# Patient Record
Sex: FEMALE | Race: WHITE | NOT HISPANIC OR LATINO | Employment: OTHER | URBAN - METROPOLITAN AREA
[De-identification: names, ages, dates, MRNs, and addresses within clinical notes are randomized per-mention and may not be internally consistent; named-entity substitution may affect disease eponyms.]

---

## 2017-05-18 ENCOUNTER — TRANSFERRED RECORDS (OUTPATIENT)
Dept: HEALTH INFORMATION MANAGEMENT | Facility: CLINIC | Age: 62
End: 2017-05-18

## 2017-07-25 DIAGNOSIS — F43.23 ADJUSTMENT DISORDER WITH MIXED ANXIETY AND DEPRESSED MOOD: ICD-10-CM

## 2017-07-25 NOTE — TELEPHONE ENCOUNTER
Fluoxetine 20mg     Last Written Prescription Date: 5/10/2016  Last Fill Quantity: 90, # refills: 3  Last Office Visit with [facility] primary care provider:  5/10/2016 annual exam with Dr. La, no future appt        Last PHQ-9 score on record=   PHQ-9 SCORE 5/10/2016   Total Score 3

## 2017-08-28 DIAGNOSIS — F43.23 ADJUSTMENT DISORDER WITH MIXED ANXIETY AND DEPRESSED MOOD: ICD-10-CM

## 2017-08-28 NOTE — TELEPHONE ENCOUNTER
Pending Prescriptions:                       Disp   Refills    FLUoxetine (PROZAC) 20 MG capsule [Pharmac*30 cap*0        Sig: TAKE ONE CAPSULE BY MOUTH EVERY DAY           Last Written Prescription Date: 7/25/2017  Last Fill Quantity: 30, # refills: 0  Last Office Visit with [facility] primary care provider:  5/10/2016     Next 5 appointments (look out 90 days)     Sep 08, 2017  9:30 AM CDT   PHYSICAL with Sonia Bowers MD   [facility] ([facility]    [address] [place] [id number] [phone]                   Last PHQ-9 score on record=   PHQ-9 SCORE 5/10/2016   Total Score 3

## 2017-09-08 ENCOUNTER — OFFICE VISIT (OUTPATIENT)
Dept: OBGYN | Facility: CLINIC | Age: 62
End: 2017-09-08
Payer: COMMERCIAL

## 2017-09-08 VITALS
HEIGHT: 65 IN | BODY MASS INDEX: 34.16 KG/M2 | SYSTOLIC BLOOD PRESSURE: 128 MMHG | WEIGHT: 205 LBS | DIASTOLIC BLOOD PRESSURE: 80 MMHG

## 2017-09-08 DIAGNOSIS — E78.5 HYPERLIPIDEMIA LDL GOAL <130: ICD-10-CM

## 2017-09-08 DIAGNOSIS — Z13.220 ENCOUNTER FOR LIPID SCREENING FOR CARDIOVASCULAR DISEASE: ICD-10-CM

## 2017-09-08 DIAGNOSIS — Z13.6 ENCOUNTER FOR LIPID SCREENING FOR CARDIOVASCULAR DISEASE: ICD-10-CM

## 2017-09-08 DIAGNOSIS — Z13.29 SCREENING FOR THYROID DISORDER: ICD-10-CM

## 2017-09-08 DIAGNOSIS — Z01.419 ENCOUNTER FOR GYNECOLOGICAL EXAMINATION WITHOUT ABNORMAL FINDING: Primary | ICD-10-CM

## 2017-09-08 DIAGNOSIS — Z13.1 SCREENING FOR DIABETES MELLITUS: ICD-10-CM

## 2017-09-08 DIAGNOSIS — F43.23 ADJUSTMENT DISORDER WITH MIXED ANXIETY AND DEPRESSED MOOD: ICD-10-CM

## 2017-09-08 LAB
CHOLEST SERPL-MCNC: 214 MG/DL
GLUCOSE BLD-MCNC: 84 MG/DL (ref 70–99)
HDLC SERPL-MCNC: 46 MG/DL
LDLC SERPL CALC-MCNC: 125 MG/DL
NONHDLC SERPL-MCNC: 168 MG/DL
TRIGL SERPL-MCNC: 214 MG/DL
TSH SERPL DL<=0.005 MIU/L-ACNC: 3.2 MU/L (ref 0.4–4)

## 2017-09-08 PROCEDURE — 84443 ASSAY THYROID STIM HORMONE: CPT | Performed by: OBSTETRICS & GYNECOLOGY

## 2017-09-08 PROCEDURE — 36415 COLL VENOUS BLD VENIPUNCTURE: CPT | Performed by: OBSTETRICS & GYNECOLOGY

## 2017-09-08 PROCEDURE — 99396 PREV VISIT EST AGE 40-64: CPT | Performed by: OBSTETRICS & GYNECOLOGY

## 2017-09-08 PROCEDURE — 82947 ASSAY GLUCOSE BLOOD QUANT: CPT | Performed by: OBSTETRICS & GYNECOLOGY

## 2017-09-08 PROCEDURE — 80061 LIPID PANEL: CPT | Performed by: OBSTETRICS & GYNECOLOGY

## 2017-09-08 ASSESSMENT — ANXIETY QUESTIONNAIRES
3. WORRYING TOO MUCH ABOUT DIFFERENT THINGS: NOT AT ALL
6. BECOMING EASILY ANNOYED OR IRRITABLE: NOT AT ALL
5. BEING SO RESTLESS THAT IT IS HARD TO SIT STILL: NOT AT ALL
2. NOT BEING ABLE TO STOP OR CONTROL WORRYING: NOT AT ALL
IF YOU CHECKED OFF ANY PROBLEMS ON THIS QUESTIONNAIRE, HOW DIFFICULT HAVE THESE PROBLEMS MADE IT FOR YOU TO DO YOUR WORK, TAKE CARE OF THINGS AT HOME, OR GET ALONG WITH OTHER PEOPLE: NOT DIFFICULT AT ALL
GAD7 TOTAL SCORE: 0
7. FEELING AFRAID AS IF SOMETHING AWFUL MIGHT HAPPEN: NOT AT ALL
1. FEELING NERVOUS, ANXIOUS, OR ON EDGE: NOT AT ALL

## 2017-09-08 ASSESSMENT — PATIENT HEALTH QUESTIONNAIRE - PHQ9
5. POOR APPETITE OR OVEREATING: NOT AT ALL
SUM OF ALL RESPONSES TO PHQ QUESTIONS 1-9: 1

## 2017-09-08 NOTE — MR AVS SNAPSHOT
After Visit Summary   9/8/2017    Marina Escudero    MRN: [id number]           Patient Information     Date Of Birth          1955        Visit Information        Provider Department      9/8/2017 9:30 AM Sonia Bowers MD [facility] [place]        Today's Diagnoses     Encounter for gynecological examination without abnormal finding    -  1    Encounter for lipid screening for cardiovascular disease        Screening for diabetes mellitus        Screening for thyroid disorder        Adjustment disorder with mixed anxiety and depressed mood        Hyperlipidemia LDL goal <130          Care Instructions    Schedule your annual screening mammogram  Follow up with your primary care provider for your other medical problems.  Continue self breast exam.  Increase physical activity and exercise.  Lab results will be called to the patient.  Usual safety and preventative measures counseling done.  BMI >25  Weight loss encouraged.  Last pap smear (2016) was normal but no HPV testing done.  No pap was obtained this year but will repeat with co-testing in 3yrs.  This was discussed with the patient and she agrees with the plan.  Discussed Osteoporosis screening as well as calcium and Vitamin D recommendations.  Will repeat bone scan at age 64.          Follow-ups after your visit        Follow-up notes from your care team     Return in about 1 year (around 9/8/2018) for Annual Exam.      Your next 10 appointments already scheduled     Sep 14, 2017 10:15 AM CDT   MA SCREENING DIGITAL BILATERAL with WEMA1   [facility] [place] ([facility] [place])    [address] [place] [address], [address]  [facility] [address] [phone]           Do not use any powder, lotion or deodorant under your arms or on your breast. If you do, we will ask you to remove it before your exam.  Wear comfortable, two-piece clothing.  If you have any allergies, tell your care team.  Bring any previous  "mammograms from other facilities or have them mailed to the breast center. Three-dimensional (3D) mammograms are available at [facility] locations in [facility], [facility], and [place]. [facility] locations include [place] and [facility] in [place]. Benefits of 3D mammograms include: - Improved rate of cancer detection - Decreases your chance of having to go back for more tests, which means fewer: - \"False-positive\" results (This means that there is an abnormal area but it isn't cancer.) - Invasive testing procedures, such as a biopsy or surgery - Can provide clearer images of the breast if you have dense breast tissue. 3D mammography is an optional exam that anyone can have with a 2D mammogram. It doesn't replace or take the place of a 2D mammogram. 2D mammograms remain an effective screening test for all women.  Not all insurance companies cover the cost of a 3D mammogram. Check with your insurance.              Who to contact     If you have questions or need follow up information about today's clinic visit or your schedule please contact [facility] directly at [phone].  Normal or non-critical lab and imaging results will be communicated to you by [facility], letter or phone within 4 business days after the clinic has received the results. If you do not hear from us within 7 days, please contact the clinic through [employer] or phone. If you have a critical or abnormal lab result, we will notify you by phone as soon as possible.  Submit refill requests through [employer]ond or call your pharmacy and they will forward the refill request to us. Please allow 3 business days for your refill to be completed.          Additional Information About Your Visit        [facility] [employer] Information     infibond lets you send messages to your doctor, view your test results, renew your prescriptions, schedule appointments and more. To sign up, go to [email or web address] " ". Click on \"Log in\" on the left side of the screen, which will take you to the Welcome page. Then click on \"Sign up Now\" on the right side of the page.     You will be asked to enter the access code listed below, as well as some personal information. Please follow the directions to create your username and password.     Your access code is: [email or web address]  Expires: 2017 10:16 AM     Your access code will  in 90 days. If you need help or a new code, please call your [facility] or [phone].        Care EveryWhere ID     This is your Care EveryWhere ID. This could be used by other organizations to access your [facility] medical records  [id number]        Your Vitals Were     Height Breastfeeding? BMI (Body Mass Index)             5' 5\" (1.651 m) No 34.11 kg/m2          Blood Pressure from Last 3 Encounters:   17 128/80   05/10/16 130/80   10/01/15 120/76    Weight from Last 3 Encounters:   17 205 lb (93 kg)   05/10/16 199 lb (90.3 kg)   10/01/15 214 lb (97.1 kg)              We Performed the Following     Glucose, whole blood     Lipid panel reflex to direct LDL     TSH with Free T4 Reflex          Today's Medication Changes          These changes are accurate as of: 17 10:16 AM.  If you have any questions, ask your nurse or doctor.               These medicines have changed or have updated prescriptions.        Dose/Directions    FLUoxetine 20 MG capsule   Commonly known as:  PROzac   This may have changed:  See the new instructions.   Used for:  Adjustment disorder with mixed anxiety and depressed mood   Changed by:  Sonia Bowers MD        Dose:  20 mg   Take 1 capsule (20 mg) by mouth daily   Quantity:  90 capsule   Refills:  3            Where to get your medicines      These medications were sent to [facility] [address]  [place], [place] - [address]  [address] [place] [address]     Phone:  [phone]     FLUoxetine 20 MG capsule                Primary Care " Provider Office Phone # Fax #    Isidro La [phone]       XXX RETIRED XXX [address] [address]        Equal Access to Services     PREM MO : Hadduncan karthikeyan ku shanao Soindraali, waaxda luqadaha, qaybta kaalmada adeegyada, waxbenjamin clark kaseydamian solis noelle vergara. So [facility] [phone].    ATENCIÓN: Si habla español, tiene a kevin disposición servicios gratuitos de asistencia lingüística. Llame al [phone].    We comply with applicable federal civil rights laws and [place] laws. We do not discriminate on the basis of race, color, national origin, age, disability sex, sexual orientation or gender identity.            Thank you!     Thank you for choosing [facility]  for your care. Our goal is always to provide you with excellent care. Hearing back from our patients is one way we can continue to improve our services. Please take a few minutes to complete the written survey that you may receive in the mail after your visit with us. Thank you!             Your Updated Medication List - Protect others around you: Learn how to safely use, store and throw away your medicines at www.disposemymeds.org.          This list is accurate as of: 9/8/17 10:16 AM.  Always use your most recent med list.                   Brand Name Dispense Instructions for use Diagnosis    ASPIRIN PO      Take 81 mg by mouth        BIOTIN 5000 PO           flax seed oil 1000 MG capsule      Take 1 capsule by mouth daily        FLUoxetine 20 MG capsule    PROzac    90 capsule    Take 1 capsule (20 mg) by mouth daily    Adjustment disorder with mixed anxiety and depressed mood       MULTIVITAMIN PO      Take 1 tablet by mouth daily        neomycin-polymyxin-hydrocortisone 3.5-71479-7 otic suspension    CORTISPORIN    10 mL    Place 4 drops in ear(s) 4 times daily    Ear pain, right       OMEGA-3 FISH OIL PO      Take 1 tablet by mouth daily        VITAMIN D (CHOLECALCIFEROL) PO       Take by mouth daily

## 2017-09-08 NOTE — PATIENT INSTRUCTIONS
Schedule your annual screening mammogram  Follow up with your primary care provider for your other medical problems.  Continue self breast exam.  Increase physical activity and exercise.  Lab results will be called to the patient.  Usual safety and preventative measures counseling done.  BMI >25  Weight loss encouraged.  Last pap smear (2016) was normal but no HPV testing done.  No pap was obtained this year but will repeat with co-testing in 3yrs.  This was discussed with the patient and she agrees with the plan.  Discussed Osteoporosis screening as well as calcium and Vitamin D recommendations.  Will repeat bone scan at age 64.

## 2017-09-08 NOTE — PROGRESS NOTES
Please inform of results --cholesterol elevated.  Total cholesterol and triglycerides most concerning.  HDL (good cholesterol) is a bit low but LDL (bad cholesterol) normal (<140).  Thyroid and FBS normal.  I would like Marina to work really hard on diet and exercise over the next 3 months and then repeat fasting cholesterol testing.  Limit her trans and saturated fats, limit carb/sugar intake, limit alcohol, decrease calorie intake and work on weight loss.  Increase exercise as able.  May consider adding daily fish oil to regimen to target elevated TGs.   If still abnormal after these changes, may consider seeing internal medicine MD for assistance

## 2017-09-08 NOTE — PROGRESS NOTES
Marina is a 62 year old No obstetric history on file. female who presents for annual exam.     Besides routine health maintenance,  she would like to discuss cold x 1 week, breast reduction and fasting for labs..    HPI:  Here today for yearly exam --former patient of Dr. La.  Doing well.  Postmenopausal.  No vb/spotting.  Denies hot flushes or night sweats --had very easy menopausal transition.  Not currently SA and denies any vaginal dryness or pain.  No bowel/bladder issues.  No leaking or incontinence    ; retired this year!!  Worked in [employer] for years with [employer] and then her last 5yrs with [employer]; now working parttime at [employer] as ; loving it!  -staying active at work; not much formal exercise --hoping to uncover and start using stationary bike  +needs to arrange mammmogram; +SBE on occ --considering breast reduction as back and shoulder pain has worsened  No PCP --fasting today for bloodwork  Up to date on colonoscopy  -declines flu shot today as still recovering from URI  *daughter is Sonia Aaron    GYNECOLOGIC HISTORY:    No LMP recorded. Patient is postmenopausal.  Her current contraception method is: menopause.  She  reports that she has never smoked. She has never used smokeless tobacco.      Patient is not sexually active.  STD testing offered?  Declined  Last PHQ-9 score on record =   PHQ-9 SCORE 9/8/2017   Total Score 1     Last GAD7 score on record =   JAY-7 SCORE 9/8/2017   Total Score 0     Alcohol Score = 2    HEALTH MAINTENANCE:  Cholesterol: (  Cholesterol   Date Value Ref Range Status   03/10/2015 259 (A) 115 - 199 mg/dL Final   11/14/2013 203 (A) 115 - 199 mg/dL Final    3/10/15   Total= 259, Triglycerides=283, HDL=52, JLN=656  Last Mammo: one year ago, Result: normal, Next Mammo: will schedule   Pap:   Lab Results   Component Value Date    PAP NIL 05/10/2016    5/10/16 WNL   Colonoscopy:  2016, Result: normal, Next Colonoscopy: 9  years.  Dexa:  5/10/16    Health maintenance updated:  yes    HISTORY:  Obstetric History       T1      L0     SAB0   TAB0   Ectopic0   Multiple0   Live Births0       # Outcome Date GA Lbr Sammy/2nd Weight Sex Delivery Anes PTL Lv   1 Term               Name: Sonia Aaron          Patient Active Problem List   Diagnosis     Pain in joint, lower leg     Edema     Tibia/fibula fracture     Hyperlipidemia LDL goal <130     Recurrent major depression in remission (H)     Past Surgical History:   Procedure Laterality Date     ARTHROPLASTY CARPOMETACARPAL (THUMB JOINT)  2014    Procedure: ARTHROPLASTY CARPOMETACARPAL (THUMB JOINT);  Surgeon: Ariana Kaminski MD;  [facility]: [facility]     ENT SURGERY      rhinoplasty     GI SURGERY      laparoscopy     GYN SURGERY      c section     KNEE SURGERY Right     fracture; TRIA      Social History   Substance Use Topics     Smoking status: Never Smoker     Smokeless tobacco: Never Used     Alcohol use 0.0 oz/week     0 Standard drinks or equivalent per week      Comment: social      Problem (# of Occurrences) Relation (Name,Age of Onset)    Breast Cancer (1) Paternal Grandmother    DIABETES (1) Mother    Hypertension (1) Mother            Current Outpatient Prescriptions   Medication Sig     VITAMIN D, CHOLECALCIFEROL, PO Take by mouth daily     FLUoxetine (PROZAC) 20 MG capsule Take 1 capsule (20 mg) by mouth daily     BIOTIN 5000 PO      ASPIRIN PO Take 81 mg by mouth     Flaxseed, Linseed, (FLAX SEED OIL) 1000 MG capsule Take 1 capsule by mouth daily     Omega-3 Fatty Acids (OMEGA-3 FISH OIL PO) Take 1 tablet by mouth daily     Multiple Vitamins-Minerals (MULTIVITAMIN OR) Take 1 tablet by mouth daily     [DISCONTINUED] FLUoxetine (PROZAC) 20 MG capsule TAKE ONE CAPSULE BY MOUTH EVERY DAY     neomycin-polymyxin-hydrocortisone (CORTISPORIN) 3.5-04224-8 otic suspension Place 4 drops in ear(s) 4 times daily (Patient not taking: Reported on 2017)     No  "current facility-administered medications for this visit.      No Known Allergies    Past medical, surgical, social and family histories were reviewed and updated in EPIC.    ROS:   12 point review of systems negative other than symptoms noted below.  Constitutional: Fatigue  Head: Nasal Congestion  Respiratory: Cough  Musculoskeletal: Joint Pain    EXAM:  /80  Ht 5' 5\" (1.651 m)  Wt 205 lb (93 kg)  Breastfeeding? No  BMI 34.11 kg/m2   BMI: Body mass index is 34.11 kg/(m^2).    PHYSICAL EXAM:  Constitutional:  Appearance: Well nourished, well developed, alert, in no acute distress  Neck:  Lymph Nodes:  No lymphadenopathy present    Thyroid:  Gland size normal, nontender, no nodules or masses present  on palpation  Chest:  Respiratory Effort:  Breathing unlabored  Cardiovascular:    Heart: Auscultation:  Regular rate, normal rhythm, no murmurs present  Breasts: Inspection of Breasts:  No lymphadenopathy present., Palpation of Breasts and Axillae:  No masses present on palpation, no breast tenderness., Axillary Lymph Nodes:  No lymphadenopathy present. and No nodularity, asymmetry or nipple discharge bilaterally.  Gastrointestinal:   Abdominal Examination:  Abdomen nontender to palpation, tone normal without rigidity or guarding, no masses present, umbilicus without lesions   Liver and Spleen:  No hepatomegaly present, liver nontender to palpation    Hernias:  No hernias present  Lymphatic: Lymph Nodes:  No other lymphadenopathy present  Skin:  General Inspection:  No rashes present, no lesions present, no areas of  discoloration    Genitalia and Groin:  No rashes present, no lesions present, no areas of  discoloration, no masses present  Neurologic/Psychiatric:    Mental Status:  Oriented X3     Pelvic Exam:  External Genitalia:     Normal appearance for age, no discharge present, no tenderness present, no inflammatory lesions present, color normal  Vagina:     Normal vaginal vault without central or " paravaginal defects, no discharge present, no inflammatory lesions present, no masses present  Bladder:     Nontender to palpation  Urethra:   Urethral Body:  Urethra palpation normal, urethra structural support normal   Urethral Meatus:  No erythema or lesions present  Cervix:     Appearance healthy, no lesions present, nontender to palpation, no bleeding present  Uterus:     Uterus: firm, normal sized and nontender, midplane in position.   Adnexa:     No adnexal tenderness present, no adnexal masses present  Perineum:     Perineum within normal limits, no evidence of trauma, no rashes or skin lesions present  Anus:     Anus within normal limits, no hemorrhoids present  Inguinal Lymph Nodes:     No lymphadenopathy present  Pubic Hair:     Normal pubic hair distribution for age  Genitalia and Groin:     No rashes present, no lesions present, no areas of discoloration, no masses present      COUNSELING:   Reviewed preventive health counseling, as reflected in patient instructions  Special attention given to:        Regular exercise       Healthy diet/nutrition    BMI: Body mass index is 34.11 kg/(m^2).  Weight management plan: Discussed healthy diet and exercise guidelines and patient will follow up in 12 months in clinic to re-evaluate.    ASSESSMENT:  62 year old female with satisfactory annual exam.    ICD-10-CM    1. Encounter for gynecological examination without abnormal finding Z01.419    2. Encounter for lipid screening for cardiovascular disease Z13.220 Lipid panel reflex to direct LDL    Z13.6    3. Screening for diabetes mellitus Z13.1 Glucose, whole blood   4. Screening for thyroid disorder Z13.29 TSH with Free T4 Reflex   5. Adjustment disorder with mixed anxiety and depressed mood F43.23 FLUoxetine (PROZAC) 20 MG capsule   6. Hyperlipidemia LDL goal <130 E78.5        PLAN:  Patient Instructions   Schedule your annual screening mammogram  Follow up with your primary care provider for your other medical  problems.  Continue self breast exam.  Increase physical activity and exercise.  Lab results will be called to the patient.  Usual safety and preventative measures counseling done.  BMI >25  Weight loss encouraged.  Last pap smear (2016) was normal but no HPV testing done.  No pap was obtained this year but will repeat with co-testing in 3yrs.  This was discussed with the patient and she agrees with the plan.  Discussed Osteoporosis screening as well as calcium and Vitamin D recommendations.  Will repeat bone scan at age 64.      Sonia Bowers MD

## 2017-09-09 ASSESSMENT — ANXIETY QUESTIONNAIRES: GAD7 TOTAL SCORE: 0

## 2017-09-11 ENCOUNTER — TELEPHONE (OUTPATIENT)
Dept: NURSING | Facility: CLINIC | Age: 62
End: 2017-09-11

## 2017-09-11 DIAGNOSIS — Z13.220 SCREENING, LIPID: Primary | ICD-10-CM

## 2017-09-11 NOTE — TELEPHONE ENCOUNTER
I just recommended either upstairs at [facility] or Dr. Darwin Valenzuela (not sure that this is the correct spelling)

## 2017-09-11 NOTE — TELEPHONE ENCOUNTER
Pt informed. Future lab ordered. Pt lost name of plastic surgeon Dr. Bowers referred her to for breast reduction. Routing to Dr. Bowers. Please advise. Can leave message on pt's phone.     Notes Recorded by Sonia Bowers MD on 9/8/2017 at 5:02 PM  Please inform of results --cholesterol elevated.  Total cholesterol and triglycerides most concerning.  HDL (good cholesterol) is a bit low but LDL (bad cholesterol) normal (<140).  Thyroid and FBS normal.  I would like Marina to work really hard on diet and exercise over the next 3 months and then repeat fasting cholesterol testing.  Limit her trans and saturated fats, limit carb/sugar intake, limit alcohol, decrease calorie intake and work on weight loss.  Increase exercise as able.  May consider adding daily fish oil to regimen to target elevated TGs.   If still abnormal after these changes, may consider seeing internal medicine MD for assistance

## 2017-09-14 ENCOUNTER — RADIANT APPOINTMENT (OUTPATIENT)
Dept: MAMMOGRAPHY | Facility: CLINIC | Age: 62
End: 2017-09-14
Payer: COMMERCIAL

## 2017-09-14 DIAGNOSIS — Z12.31 VISIT FOR SCREENING MAMMOGRAM: ICD-10-CM

## 2017-09-14 PROCEDURE — G0202 SCR MAMMO BI INCL CAD: HCPCS | Mod: TC

## 2018-04-24 ENCOUNTER — TRANSFERRED RECORDS (OUTPATIENT)
Dept: HEALTH INFORMATION MANAGEMENT | Facility: CLINIC | Age: 63
End: 2018-04-24

## 2018-08-30 ENCOUNTER — TRANSFERRED RECORDS (OUTPATIENT)
Dept: HEALTH INFORMATION MANAGEMENT | Facility: CLINIC | Age: 63
End: 2018-08-30

## 2018-09-14 DIAGNOSIS — F43.23 ADJUSTMENT DISORDER WITH MIXED ANXIETY AND DEPRESSED MOOD: ICD-10-CM

## 2018-09-14 NOTE — TELEPHONE ENCOUNTER
"Requested Prescriptions   Pending Prescriptions Disp Refills     FLUoxetine (PROZAC) 20 MG capsule [Pharmacy Med Name: FLUOXETINE HCL 20MG CAPS] 90 capsule 3     Sig: TAKE ONE CAPSULE (20 MG) BY MOUTH DAILY    SSRIs Protocol Failed    9/14/2018  5:13 AM       Failed - Recent (12 mo) or future (30 days) visit within the authorizing provider's specialty    Patient had office visit in the last 12 months or has a visit in the next 30 days with authorizing provider or within the authorizing provider's specialty.  See \"Patient Info\" tab in inbasket, or \"Choose Columns\" in Meds & Orders section of the refill encounter.           Passed - Patient is age 18 or older       Passed - No active pregnancy on record       Passed - No positive pregnancy test in last 12 months        Last Written Prescription Date:  9/8/17  Last Fill Quantity: 90,  # refills: 3   Last office visit: 9/8/2017 with prescribing provider:  Robinson   Future Office Visit:   Next 5 appointments (look out 90 days)     Oct 17, 2018  9:00 AM CDT   PHYSICAL with Sonia Bowers MD   [facility] [place] ([facility] [place])    [address] [address] [phone]                   "

## 2018-09-17 NOTE — TELEPHONE ENCOUNTER
Left voicemail for patient to call back. She has an appt next month. Will she run out of this med before that appt? How is the med working for her?      Pamella Hsieh RN

## 2018-10-17 ENCOUNTER — OFFICE VISIT (OUTPATIENT)
Dept: OBGYN | Facility: CLINIC | Age: 63
End: 2018-10-17
Payer: COMMERCIAL

## 2018-10-17 ENCOUNTER — TELEPHONE (OUTPATIENT)
Dept: NURSING | Facility: CLINIC | Age: 63
End: 2018-10-17

## 2018-10-17 ENCOUNTER — RADIANT APPOINTMENT (OUTPATIENT)
Dept: MAMMOGRAPHY | Facility: CLINIC | Age: 63
End: 2018-10-17
Payer: COMMERCIAL

## 2018-10-17 VITALS
SYSTOLIC BLOOD PRESSURE: 116 MMHG | HEIGHT: 66 IN | WEIGHT: 196 LBS | BODY MASS INDEX: 31.5 KG/M2 | DIASTOLIC BLOOD PRESSURE: 76 MMHG | HEART RATE: 74 BPM

## 2018-10-17 DIAGNOSIS — Z12.31 VISIT FOR SCREENING MAMMOGRAM: ICD-10-CM

## 2018-10-17 DIAGNOSIS — F43.23 ADJUSTMENT DISORDER WITH MIXED ANXIETY AND DEPRESSED MOOD: ICD-10-CM

## 2018-10-17 DIAGNOSIS — Z01.419 ENCOUNTER FOR GYNECOLOGICAL EXAMINATION WITHOUT ABNORMAL FINDING: Primary | ICD-10-CM

## 2018-10-17 DIAGNOSIS — Z13.6 SCREENING FOR CARDIOVASCULAR CONDITION: ICD-10-CM

## 2018-10-17 DIAGNOSIS — Z13.1 SCREENING FOR DIABETES MELLITUS: ICD-10-CM

## 2018-10-17 LAB
CHOLEST SERPL-MCNC: 198 MG/DL
GLUCOSE BLD-MCNC: 82 MG/DL (ref 70–99)
HDLC SERPL-MCNC: 56 MG/DL
LDLC SERPL CALC-MCNC: 110 MG/DL
NONHDLC SERPL-MCNC: 142 MG/DL
TRIGL SERPL-MCNC: 161 MG/DL

## 2018-10-17 PROCEDURE — 36415 COLL VENOUS BLD VENIPUNCTURE: CPT | Performed by: OBSTETRICS & GYNECOLOGY

## 2018-10-17 PROCEDURE — 77067 SCR MAMMO BI INCL CAD: CPT | Mod: TC

## 2018-10-17 PROCEDURE — 82947 ASSAY GLUCOSE BLOOD QUANT: CPT | Performed by: OBSTETRICS & GYNECOLOGY

## 2018-10-17 PROCEDURE — 99396 PREV VISIT EST AGE 40-64: CPT | Performed by: OBSTETRICS & GYNECOLOGY

## 2018-10-17 PROCEDURE — 80061 LIPID PANEL: CPT | Performed by: OBSTETRICS & GYNECOLOGY

## 2018-10-17 ASSESSMENT — ANXIETY QUESTIONNAIRES
5. BEING SO RESTLESS THAT IT IS HARD TO SIT STILL: NOT AT ALL
2. NOT BEING ABLE TO STOP OR CONTROL WORRYING: NOT AT ALL
IF YOU CHECKED OFF ANY PROBLEMS ON THIS QUESTIONNAIRE, HOW DIFFICULT HAVE THESE PROBLEMS MADE IT FOR YOU TO DO YOUR WORK, TAKE CARE OF THINGS AT HOME, OR GET ALONG WITH OTHER PEOPLE: NOT DIFFICULT AT ALL
7. FEELING AFRAID AS IF SOMETHING AWFUL MIGHT HAPPEN: NOT AT ALL
6. BECOMING EASILY ANNOYED OR IRRITABLE: SEVERAL DAYS
GAD7 TOTAL SCORE: 2
1. FEELING NERVOUS, ANXIOUS, OR ON EDGE: SEVERAL DAYS
3. WORRYING TOO MUCH ABOUT DIFFERENT THINGS: NOT AT ALL

## 2018-10-17 ASSESSMENT — PATIENT HEALTH QUESTIONNAIRE - PHQ9: 5. POOR APPETITE OR OVEREATING: NOT AT ALL

## 2018-10-17 NOTE — TELEPHONE ENCOUNTER
Pt called back. Reviewed note from Dr Bowers regarding her lab results. Pt says she is happy to hear the good results. SHe will notify us when she gets established with a PCP.

## 2018-10-17 NOTE — PATIENT INSTRUCTIONS
Follow up with your primary care provider for your other medical problems.  Discussed importance of establishing primary care for possible future age related, non GYN conditions.    Continue self breast exam.  Increase physical activity and exercise.  Lab results will be called to the patient.  Usual safety and preventative measures counseling done.  BMI >25  Weight loss encouraged.  Last pap smear (2016) was normal and negative for the DNA of high risk HPV subtypes.  No pap was obtained this year.  This was discussed with the patient and she agrees with the plan.  Discussed Osteoporosis screening as well as calcium and Vitamin D recommendations.  Will plan to repeat bone scan next year at age 64.  Will obtain colonoscopy records from 2016.

## 2018-10-17 NOTE — MR AVS SNAPSHOT
After Visit Summary   10/17/2018    Marina Escudero    MRN: [id number]           Patient Information     Date Of Birth          1955        Visit Information        Provider Department      10/17/2018 9:00 AM Sonia Bowers MD [facility]        Today's Diagnoses     Encounter for gynecological examination without abnormal finding    -  1    Adjustment disorder with mixed anxiety and depressed mood        Screening for cardiovascular condition        Screening for diabetes mellitus          Care Instructions    Follow up with your primary care provider for your other medical problems.  Discussed importance of establishing primary care for possible future age related, non GYN conditions.    Continue self breast exam.  Increase physical activity and exercise.  Lab results will be called to the patient.  Usual safety and preventative measures counseling done.  BMI >25  Weight loss encouraged.  Last pap smear (2016) was normal and negative for the DNA of high risk HPV subtypes.  No pap was obtained this year.  This was discussed with the patient and she agrees with the plan.  Discussed Osteoporosis screening as well as calcium and Vitamin D recommendations.  Will plan to repeat bone scan next year at age 64.  Will obtain colonoscopy records from 2016.          Follow-ups after your visit        Follow-up notes from your care team     Return in about 1 year (around 10/17/2019) for Annual Exam.      Who to contact     If you have questions or need follow up information about today's clinic visit or your schedule please contact [facility] directly at [phone].  Normal or non-critical lab and imaging results will be communicated to you by MyChart, letter or phone within 4 business days after the clinic has received the results. If you do not hear from us within 7 days, please contact the clinic through MyChart or phone. If you have a critical or abnormal lab  "result, we will notify you by phone as soon as possible.  Submit refill requests through basestone or call your pharmacy and they will forward the refill request to us. Please allow 3 business days for your refill to be completed.          Additional Information About Your Visit        Chance (app)hart Information     basestone lets you send messages to your doctor, view your test results, renew your prescriptions, schedule appointments and more. To sign up, go to [email or web address] . Click on \"Log in\" on the left side of the screen, which will take you to the Welcome page. Then click on \"Sign up Now\" on the right side of the page.     You will be asked to enter the access code listed below, as well as some personal information. Please follow the directions to create your username and password.     Your access code is: [email or web address]  Expires: 1/15/2019  8:34 AM     Your access code will  in 90 days. If you need help or a new code, please call your [facility] clinic or [phone].        Care EveryWhere ID     This is your Care EveryWhere ID. This could be used by other organizations to access your [facility] medical records  [id number]        Your Vitals Were     Pulse Height BMI (Body Mass Index)             74 5' 5.5\" (1.664 m) 32.12 kg/m2          Blood Pressure from Last 3 Encounters:   10/17/18 116/76   17 128/80   05/10/16 130/80    Weight from Last 3 Encounters:   10/17/18 196 lb (88.9 kg)   17 205 lb (93 kg)   05/10/16 199 lb (90.3 kg)              We Performed the Following     Glucose, whole blood     Lipid panel reflex to direct LDL Fasting          Where to get your medicines      These medications were sent to [facility] [address]  [place] - [address]  [address] [place] [address]     Phone:  [phone]     FLUoxetine 20 MG capsule          Primary Care Provider    None Specified       No primary provider on file.        Equal Access to Services     PREM MO AH: " Hadii karthikeyan Gonsalez, wateddyda luqadaha, qaybta kaalterry nova, brandy jesusin hayaaholley parksdamian rosalindguanaco laobedholley ursula. So [facility] [phone].    ATENCIÓN: Si habla chris, tiene a kevin disposición servicios gratuitos de asistencia lingüística. Llame al [phone].    We comply with applicable federal civil rights laws and [place] laws. We do not discriminate on the basis of race, color, national origin, age, disability, sex, sexual orientation, or gender identity.            Thank you!     Thank you for choosing [facility]  for your care. Our goal is always to provide you with excellent care. Hearing back from our patients is one way we can continue to improve our services. Please take a few minutes to complete the written survey that you may receive in the mail after your visit with us. Thank you!             Your Updated Medication List - Protect others around you: Learn how to safely use, store and throw away your medicines at www.disposemymeds.org.          This list is accurate as of 10/17/18  9:32 AM.  Always use your most recent med list.                   Brand Name Dispense Instructions for use Diagnosis    ASPIRIN PO      Take 81 mg by mouth        BIOTIN 5000 PO           flax seed oil 1000 MG capsule      Take 1 capsule by mouth daily        FLUoxetine 20 MG capsule    PROzac    90 capsule    Take 1 capsule (20 mg) by mouth daily    Adjustment disorder with mixed anxiety and depressed mood       MULTIVITAMIN PO      Take 1 tablet by mouth daily        OMEGA-3 FISH OIL PO      Take 1 tablet by mouth daily        VITAMIN D (CHOLECALCIFEROL) PO      Take by mouth daily

## 2018-10-17 NOTE — PROGRESS NOTES
"Marina is a 63 year old  female who presents for annual exam.     Besides routine health maintenance, she has no other health concerns today .    HPI:  Here today for yearly exam --doing well.  Postmenopausal.  No vb/spotting.  Denies any significant hot flushes or night sweats.  Really had an easy menopausal transition.  Not currently SA.  Denies vaginal dryness or pain.  No bowel/bladder issues.  No leaking or incontinence issues.    ; 1 grown daughter; semi-retired --working a few days per week in [facility] [employer] at [employer] --will be \"re-evaluating\" things this year and decide about working moving forward with age 64 on horizon  -staying active; no formal exercise but generally active at work and at home  +mammo already today; +SBE --no concerns  No PCP --had elevated TGs with us last year; has been working hard on diet in the last year; questions about need for PCP  -had colonoscopy in 2016 but cannot remember where; will try to obtain records  -mild osteopenia on dexa in 2016 (spine -1.0, L hip -1.0, R hip -1.3) --will plan to repeat next year at age 64  -has already had flu shot  -stable on fluoxetine      GYNECOLOGIC HISTORY:    No LMP recorded. Patient is postmenopausal.  Her current contraception method is: not sexually active.  She  reports that she has never smoked. She has never used smokeless tobacco.    Patient is not sexually active.  STD testing offered?  Declined  Last PHQ-9 score on record =   PHQ-9 SCORE 10/17/2018   Total Score 2     Last GAD7 score on record =   JAY-7 SCORE 10/17/2018   Total Score 2     Alcohol Score = 2    HEALTH MAINTENANCE:  Cholesterol: 17   Total= 214, Triglycerides=214, HDL=46, AVM=074, FBS=84, TSH=3.20  Last Mammo: 17, Result: normal, Next Mammo: today   Pap:   Lab Results   Component Value Date    PAP NIL 05/10/2016      Colonoscopy: 2016, Result: normal, Next Colonoscopy: 8 years.  Dexa:  05/10/16 minimal osteopenia in lumbar spine and minimal " osteopenia in bilateral hips    Health maintenance updated:  no, need a Dexa; will schedule at another time    HISTORY:  Obstetric History       T1      L0     SAB0   TAB0   Ectopic0   Multiple0   Live Births0       # Outcome Date GA Lbr Sammy/2nd Weight Sex Delivery Anes PTL Lv   1 Term               Name: Sonia Aaron          Patient Active Problem List   Diagnosis     Pain in joint, lower leg     Edema     Tibia/fibula fracture     Hyperlipidemia LDL goal <130     Recurrent major depression in remission (H)     Past Surgical History:   Procedure Laterality Date     ARTHROPLASTY CARPOMETACARPAL (THUMB JOINT)  2014    Procedure: ARTHROPLASTY CARPOMETACARPAL (THUMB JOINT);  Surgeon: Ariana Kaminski MD;  [facility]: [facility]     ENT SURGERY      rhinoplasty     GI SURGERY      laparoscopy     GYN SURGERY      c section     KNEE SURGERY Right     fracture; TRIA      Social History   Substance Use Topics     Smoking status: Never Smoker     Smokeless tobacco: Never Used     Alcohol use 0.0 oz/week     0 Standard drinks or equivalent per week      Comment: social      Problem (# of Occurrences) Relation (Name,Age of Onset)    Breast Cancer (1) Paternal Grandmother    Diabetes (1) Mother    Hypertension (1) Mother            Current Outpatient Prescriptions   Medication Sig     ASPIRIN PO Take 81 mg by mouth     BIOTIN 5000 PO      Flaxseed, Linseed, (FLAX SEED OIL) 1000 MG capsule Take 1 capsule by mouth daily     FLUoxetine (PROZAC) 20 MG capsule Take 1 capsule (20 mg) by mouth daily     Multiple Vitamins-Minerals (MULTIVITAMIN OR) Take 1 tablet by mouth daily     Omega-3 Fatty Acids (OMEGA-3 FISH OIL PO) Take 1 tablet by mouth daily     VITAMIN D, CHOLECALCIFEROL, PO Take by mouth daily     [DISCONTINUED] FLUoxetine (PROZAC) 20 MG capsule Take 1 capsule (20 mg) by mouth daily     No current facility-administered medications for this visit.      No Known Allergies    Past medical, surgical,  "social and family histories were reviewed and updated in EPIC.    ROS:   12 point review of systems negative other than symptoms noted below.    EXAM:  /76  Pulse 74  Ht 5' 5.5\" (1.664 m)  Wt 196 lb (88.9 kg)  BMI 32.12 kg/m2   BMI: Body mass index is 32.12 kg/(m^2).    PHYSICAL EXAM:  Constitutional:  Appearance: Well nourished, well developed, alert, in no acute distress  Neck:  Lymph Nodes:  No lymphadenopathy present    Thyroid:  Gland size normal, nontender, no nodules or masses present  on palpation  Chest:  Respiratory Effort:  Breathing unlabored  Cardiovascular:    Heart: Auscultation:  Regular rate, normal rhythm, no murmurs present  Breasts: Inspection of Breasts:  No lymphadenopathy present., Palpation of Breasts and Axillae:  No masses present on palpation, no breast tenderness., Axillary Lymph Nodes:  No lymphadenopathy present. and No nodularity, asymmetry or nipple discharge bilaterally.  Gastrointestinal:   Abdominal Examination:  Abdomen nontender to palpation, tone normal without rigidity or guarding, no masses present, umbilicus without lesions   Liver and Spleen:  No hepatomegaly present, liver nontender to palpation    Hernias:  No hernias present  Lymphatic: Lymph Nodes:  No other lymphadenopathy present  Skin:  General Inspection:  No rashes present, no lesions present, no areas of  discoloration    Genitalia and Groin:  No rashes present, no lesions present, no areas of  discoloration, no masses present  Neurologic/Psychiatric:    Mental Status:  Oriented X3     Pelvic Exam:  External Genitalia:     Normal appearance for age, no discharge present, no tenderness present, no inflammatory lesions present, color normal  Vagina:     Normal vaginal vault without central or paravaginal defects, ATROPHIC  Bladder:     Nontender to palpation  Urethra:   Urethral Body:  Urethra palpation normal, urethra structural support normal   Urethral Meatus:  No erythema or lesions present  Cervix:  "    Appearance healthy, no lesions present, nontender to palpation, no bleeding present  Uterus:     Nontender to palpation, no masses present, position anteflexed, mobility: normal  Adnexa:     No adnexal tenderness present, no adnexal masses present  Perineum:     Perineum within normal limits, no evidence of trauma, no rashes or skin lesions present  Inguinal Lymph Nodes:     No lymphadenopathy present      COUNSELING:   Reviewed preventive health counseling, as reflected in patient instructions  Special attention given to:        Regular exercise       Healthy diet/nutrition       Osteoporosis Prevention/Bone Health       Colon cancer screening    BMI: Body mass index is 32.12 kg/(m^2).  Weight management plan: Discussed healthy diet and exercise guidelines and patient will follow up in 12 months in clinic to re-evaluate.    ASSESSMENT:  63 year old female with satisfactory annual exam.    ICD-10-CM    1. Encounter for gynecological examination without abnormal finding Z01.419    2. Adjustment disorder with mixed anxiety and depressed mood F43.23 FLUoxetine (PROZAC) 20 MG capsule   3. Screening for cardiovascular condition Z13.6 Lipid panel reflex to direct LDL Fasting   4. Screening for diabetes mellitus Z13.1 Glucose, whole blood       PLAN:  Patient Instructions   Follow up with your primary care provider for your other medical problems.  Discussed importance of establishing primary care for possible future age related, non GYN conditions.    Continue self breast exam.  Increase physical activity and exercise.  Lab results will be called to the patient.  Usual safety and preventative measures counseling done.  BMI >25  Weight loss encouraged.  Last pap smear (2016) was normal and negative for the DNA of high risk HPV subtypes.  No pap was obtained this year.  This was discussed with the patient and she agrees with the plan.  Discussed Osteoporosis screening as well as calcium and Vitamin D recommendations.   Will plan to repeat bone scan next year at age 64.  Will obtain colonoscopy records from 2016.      Sonia Bowers MD

## 2018-10-17 NOTE — PROGRESS NOTES
Please inform of normal results --labs look much bettter.  LDL and triglycerides still very slightly elevated but look okay.  TGs much improved --ideally, we like them to be <150.  Keep up the work with diet and exercise and we can repeat these labs again next year.  If she does see/establish a new PCP, this would be something to bring to their attention as well

## 2018-10-18 ASSESSMENT — PATIENT HEALTH QUESTIONNAIRE - PHQ9: SUM OF ALL RESPONSES TO PHQ QUESTIONS 1-9: 2

## 2018-10-18 ASSESSMENT — ANXIETY QUESTIONNAIRES: GAD7 TOTAL SCORE: 2

## 2018-11-20 ENCOUNTER — TRANSFERRED RECORDS (OUTPATIENT)
Dept: HEALTH INFORMATION MANAGEMENT | Facility: CLINIC | Age: 63
End: 2018-11-20

## 2019-02-12 ENCOUNTER — TRANSFERRED RECORDS (OUTPATIENT)
Dept: HEALTH INFORMATION MANAGEMENT | Facility: CLINIC | Age: 64
End: 2019-02-12

## 2019-05-09 ENCOUNTER — TRANSFERRED RECORDS (OUTPATIENT)
Dept: HEALTH INFORMATION MANAGEMENT | Facility: CLINIC | Age: 64
End: 2019-05-09

## 2019-05-14 ENCOUNTER — OFFICE VISIT (OUTPATIENT)
Dept: FAMILY MEDICINE | Facility: CLINIC | Age: 64
End: 2019-05-14
Payer: COMMERCIAL

## 2019-05-14 VITALS
WEIGHT: 190.8 LBS | DIASTOLIC BLOOD PRESSURE: 85 MMHG | OXYGEN SATURATION: 96 % | HEART RATE: 76 BPM | BODY MASS INDEX: 30.67 KG/M2 | HEIGHT: 66 IN | SYSTOLIC BLOOD PRESSURE: 138 MMHG | TEMPERATURE: 98.9 F

## 2019-05-14 DIAGNOSIS — R03.0 PREHYPERTENSION: ICD-10-CM

## 2019-05-14 DIAGNOSIS — F33.40 RECURRENT MAJOR DEPRESSION IN REMISSION (H): Primary | ICD-10-CM

## 2019-05-14 DIAGNOSIS — M18.12 OSTEOARTHRITIS OF LEFT THUMB: ICD-10-CM

## 2019-05-14 PROCEDURE — 99204 OFFICE O/P NEW MOD 45 MIN: CPT | Performed by: INTERNAL MEDICINE

## 2019-05-14 RX ORDER — ASCORBIC ACID 1000 MG
TABLET ORAL
COMMUNITY

## 2019-05-14 ASSESSMENT — ENCOUNTER SYMPTOMS
BACK PAIN: 0
EYE PAIN: 0
LIGHT-HEADEDNESS: 0
DIFFICULTY URINATING: 0
MYALGIAS: 0
SHORTNESS OF BREATH: 0
DIARRHEA: 0
VOMITING: 0
TROUBLE SWALLOWING: 0
DYSPHORIC MOOD: 0
HALLUCINATIONS: 0
NUMBNESS: 0
NERVOUS/ANXIOUS: 0
COUGH: 0
UNEXPECTED WEIGHT CHANGE: 0
FATIGUE: 0
ABDOMINAL PAIN: 0
SLEEP DISTURBANCE: 0
SORE THROAT: 0
NAUSEA: 0
PALPITATIONS: 0
BLOOD IN STOOL: 0
HEADACHES: 0
CONSTIPATION: 0

## 2019-05-14 ASSESSMENT — MIFFLIN-ST. JEOR: SCORE: 1424.27

## 2019-05-14 ASSESSMENT — PATIENT HEALTH QUESTIONNAIRE - PHQ9: SUM OF ALL RESPONSES TO PHQ QUESTIONS 1-9: 1

## 2019-05-14 NOTE — PATIENT INSTRUCTIONS
Monitor your blood pressure twice a day (once you wake up and before bedtime).  Call doctor if:  -- your blood pressure for the top/upper number is greater than 140 or less than 90  -- your blood pressure for the bottom/lower number is greater than 90 or less than 60    Maintain a low sodium diet.  Limit your daily intake to 2500mg per day.    Maintain low fat/calorie diet and regular exercise.      Patient Education     Tips for a Low-Sodium Diet  If you have high blood pressure, heart failure, liver problems or kidney problems, you may need to watch your sodium intake. Too much sodium can cause thirst and shortness of breath. It can also make your body retain extra fluids.  You should limit the amount of sodium in your diet to mg per day.  Sodium is found in many foods. Most of our sodium comes from  processed  foods like canned soups, lunch meats and TV dinners.  A major source of sodium is salt, or sodium chloride. Salt is often used to preserve foods (extend their shelf life). We have gotten used to the taste of salt in our foods. When you start to limit your salt intake, you will notice the lack of salt. Give yourself time to adjust to the change.  Tips    To keep track of your sodium intake, write down the amount of sodium you eat for a of couple days. This gives you a good idea of which foods are high in sodium--and where you can cut back.    Do not add salt while cooking or at the table. In recipes, you can often use half the amount of salt without giving up flavor.    Do not add salt to water when making rice, pasta or potatoes.    Do not use lemon pepper--this is made with salt.    Use spices and herbs without the word  salt  in their names. Use herb blends like Mrs. Sauceda.    When eating vegetables, meats, poultry or fish, choose fresh or frozen instead of canned foods.    Eat more homemade foods that are made from scratch. Avoid boxed rice, noodles or potato dishes--these often contain salty  seasonings.    Choose foods with the least amount of packaging. These are often lower in sodium .    Read food labels to learn the sodium content for suggested serving sizes. Choose foods with the least amount of sodium.  ? Choose foods labeled  low sodium.  These have less than 140 mg of sodium per serving.    Always double-check serving sizes. If you eat two servings of a food, you will get twice as much sodium.    When you go out to eat, ask that foods be made without added salt. Ask for condiments on the side, so you can control how much you use.    You will find foods with less sodium if you shop along the outer walls of the grocery store.    Do not use a salt substitute without your doctor s okay. Some products (like Cunningham Lite Salt) contain potassium. If you are taking certain medicines, these products could raise the level of potassium in your blood.  High-sodium foods    Salt or kosher salt (one teaspoon = 2,300 mg of sodium)    Seasonings (lemon pepper, garlic salt, sea salt, seasoning salt, etc.)    Meat tenderizers and marinades    Packaged sauces or gravies    Snack foods (chips, crackers, pork rinds, salted nuts)    Cheese (natural and processed)    Cottage cheese    Fast-food and restaurant meals    Most canned vegetables and vegetable juices    Pickled and cured foods (pickles, olives, sauerkraut)    Condiments (BBQ sauce, soy sauce, teriyaki sauce, steak sauce, salad dressing, ketchup, etc.)    Canned or boxed side dishes, such as macaroni and cheese, ramen noodles, Hamburger Dougherty and Rice-A-Pietro    Frozen meals with more than 500 mg of sodium per serving    Monosodium glutamate (MSG)    Processed meats (bologna, ham, marin) and canned meats (SPAM, corned beef)    Soups and bouillon (canned, frozen or dried)    Canned tomato products (juice, spaghetti sauce, etc.)    Sports drinks such as Gatorade or Powerade    Foods covered in sauce, gravy or other coatings (broccoli with cheese sauce, chicken  fingers, etc.)    Biscuits and refrigerated rolls or breads         Patient Education     Understanding a Heart Murmur    The heart makes sounds as it beats. These sounds occur as the heart valves open and close to allow blood to flow through the heart. A heart murmur is an extra noise heard during a heartbeat. The noise is caused when blood does not flow smoothly through the heart. Heart murmurs can be innocent (harmless) or abnormal (caused by a heart problem).  What causes a heart murmur?  An innocent heart murmur can be a normal finding for many people. It may also be caused by:    Fever    Exercise    Pregnancy    Anemia    Overactive thyroid gland  An abnormal heart murmur can be caused by heart problems such as:    A damaged or diseased valve. The valve may be too narrow for blood to flow through easily. Or it may have problems opening or closing, and may leak blood backward.    A hole in the heart (septal defect). This is a problem with the heart s structure that a person is born with (congenital). It causes blood to leak through the wall that normally divides the left and right sides of the heart.  What are the symptoms of a heart murmur?  Heart murmurs do not usually cause symptoms. They tend to be found when your healthcare provider is listening to your heart for another reason. People with an abnormal heart murmur may have symptoms of the problem causing the murmur. Symptoms can include:    Feeling weak or tired    Shortness of breath, especially with exercise    Chest pain    Fast, pounding, or skipping heartbeat    Swollen ankles, feet, abdomen    Feeling dizzy or faint    Poor feeding and failing to grow normally (babies only)  How is a heart murmur treated?  An innocent heart murmur does not usually need treatment unless there is a clear cause, such as anemia. In such cases, treating the underlying cause should cure the murmur. In some cases, an innocent heart murmur may go away on its  own.  Treatment for an abnormal heart murmur depends on the cause. Options may include:    Medicines to help relieve symptoms    Procedures or surgery to fix or replace a diseased or damaged heart valve    Procedures or surgery to fix a hole in the heart  What are the complications of a heart murmur?  An innocent heart murmur has no complications. Complications of an abnormal heart murmur will vary depending on the cause. Possible complications include:    Heart failure. This problem occurs when the heart is so weak it no longer pumps blood well.    Infection of the heart s valves or inner lining (infective endocarditis)    Blood clots and stroke    Fainting    Heart attack    Sudden cardiac arrest. This problem occurs when the heart suddenly stops beating.  When should I call my healthcare provider?  Call your healthcare provider right away if you have any of these:    Chest pain    Shortness of breath    Fever of 100.4 F (38 C) or higher, or as directed    Symptoms that don t get better with treatment, or symptoms that get worse    New symptoms   Date Last Reviewed: 5/1/2016 2000-2018 [employer]. [address] [place] [address], [place] [phone]. All rights reserved. This information is not intended as a substitute for professional medical care. Always follow your healthcare professional's instructions.

## 2019-05-14 NOTE — PROGRESS NOTES
Patient comes in to establish care.      She has an upcoming surgery for her left thumb compromised by degenerative joint disease.  Had the same procedure for her right thumb with good results.  Pinching strength of the left thumb has decreased but patient is still able to go about her daily activities.    She has a history of depression which is currently under remission on fluoxetine.  She sees a counselor every 4 months.    Otherwise, she feels well and did not present any other subjective complaints.      Past Medical History:   Diagnosis Date     Heart murmur     since childhood, asymptomatic     Hyperlipidemia LDL goal <130      Recurrent major depression in remission (H)        Past Surgical History:   Procedure Laterality Date     ARTHROPLASTY CARPOMETACARPAL (THUMB JOINT)  6/11/2014    Procedure: ARTHROPLASTY CARPOMETACARPAL (THUMB JOINT);  Surgeon: Ariana Kaminski MD;  Location: [facility]     ENT SURGERY      rhinoplasty     GI SURGERY      laparoscopy     GYN SURGERY      c section     KNEE SURGERY Right     fracture; TRIA       Family History   Problem Relation Age of Onset     Hypertension Mother      Diabetes Mother      Breast Cancer Paternal Grandmother        Social History     Tobacco Use     Smoking status: Never Smoker     Smokeless tobacco: Never Used   Substance Use Topics     Alcohol use: Yes     Alcohol/week: 0.0 oz     Comment: 2 drinks per week       Review of Systems   Constitutional: Negative for fatigue and unexpected weight change.   HENT: Negative for congestion, hearing loss, sore throat and trouble swallowing.    Eyes: Negative for pain and visual disturbance.   Respiratory: Negative for cough and shortness of breath.    Cardiovascular: Negative for chest pain, palpitations and leg swelling.   Gastrointestinal: Negative for abdominal pain, blood in stool, constipation, diarrhea, nausea and vomiting.   Genitourinary: Negative for difficulty urinating.   Musculoskeletal:  "Negative for back pain and myalgias.   Neurological: Negative for syncope, light-headedness, numbness and headaches.   Psychiatric/Behavioral: Negative for dysphoric mood, hallucinations, sleep disturbance and suicidal ideas. The patient is not nervous/anxious.        /85 (BP Location: Left arm, Cuff Size: Adult Large)   Pulse 76   Temp 98.9  F (37.2  C) (Oral)   Ht 1.664 m (5' 5.5\")   Wt 86.5 kg (190 lb 12.8 oz)   SpO2 96%   BMI 31.27 kg/m        Physical Exam   Constitutional: She is oriented to person, place, and time. No distress.   HENT:   Right Ear: External ear normal.   Left Ear: External ear normal.   Mouth/Throat: Oropharynx is clear and moist.   Neck: No thyromegaly present.   Cardiovascular: Normal rate and regular rhythm.   Murmur (Grade 2 to 3 systolic heard best at RUSB) heard.  Pulmonary/Chest: Effort normal and breath sounds normal. No respiratory distress.   Musculoskeletal: She exhibits no edema or deformity.   Limited ROM of left thumb due to pain   Neurological: She is alert and oriented to person, place, and time. Coordination normal.   Psychiatric: She has a normal mood and affect.   Nursing note and vitals reviewed.        ICD-10-CM    1. Recurrent major depression in remission (H) F33.40 DEPRESSION ACTION PLAN (DAP)   2. Osteoarthritis of left thumb M18.12 diclofenac (VOLTAREN) 1 % topical gel   3. Prehypertension R03.0        Patient Instructions     Monitor your blood pressure twice a day (once you wake up and before bedtime).  Call doctor if:  -- your blood pressure for the top/upper number is greater than 140 or less than 90  -- your blood pressure for the bottom/lower number is greater than 90 or less than 60    Maintain a low sodium diet.  Limit your daily intake to 2500mg per day.    Maintain low fat/calorie diet and regular exercise.      Patient Education     Tips for a Low-Sodium Diet  If you have high blood pressure, heart failure, liver problems or kidney problems, " you may need to watch your sodium intake. Too much sodium can cause thirst and shortness of breath. It can also make your body retain extra fluids.  You should limit the amount of sodium in your diet to mg per day.  Sodium is found in many foods. Most of our sodium comes from  processed  foods like canned soups, lunch meats and TV dinners.  A major source of sodium is salt, or sodium chloride. Salt is often used to preserve foods (extend their shelf life). We have gotten used to the taste of salt in our foods. When you start to limit your salt intake, you will notice the lack of salt. Give yourself time to adjust to the change.  Tips    To keep track of your sodium intake, write down the amount of sodium you eat for a of couple days. This gives you a good idea of which foods are high in sodium--and where you can cut back.    Do not add salt while cooking or at the table. In recipes, you can often use half the amount of salt without giving up flavor.    Do not add salt to water when making rice, pasta or potatoes.    Do not use lemon pepper--this is made with salt.    Use spices and herbs without the word  salt  in their names. Use herb blends like Mrs. Sauceda.    When eating vegetables, meats, poultry or fish, choose fresh or frozen instead of canned foods.    Eat more homemade foods that are made from scratch. Avoid boxed rice, noodles or potato dishes--these often contain salty seasonings.    Choose foods with the least amount of packaging. These are often lower in sodium .    Read food labels to learn the sodium content for suggested serving sizes. Choose foods with the least amount of sodium.  ? Choose foods labeled  low sodium.  These have less than 140 mg of sodium per serving.    Always double-check serving sizes. If you eat two servings of a food, you will get twice as much sodium.    When you go out to eat, ask that foods be made without added salt. Ask for condiments on the side, so you can control how much  you use.    You will find foods with less sodium if you shop along the outer walls of the grocery store.    Do not use a salt substitute without your doctor s okay. Some products (like Cunningham Lite Salt) contain potassium. If you are taking certain medicines, these products could raise the level of potassium in your blood.  High-sodium foods    Salt or kosher salt (one teaspoon = 2,300 mg of sodium)    Seasonings (lemon pepper, garlic salt, sea salt, seasoning salt, etc.)    Meat tenderizers and marinades    Packaged sauces or gravies    Snack foods (chips, crackers, pork rinds, salted nuts)    Cheese (natural and processed)    Cottage cheese    Fast-food and restaurant meals    Most canned vegetables and vegetable juices    Pickled and cured foods (pickles, olives, sauerkraut)    Condiments (BBQ sauce, soy sauce, teriyaki sauce, steak sauce, salad dressing, ketchup, etc.)    Canned or boxed side dishes, such as macaroni and cheese, ramen noodles, Hamburger Smallwood and Rice-A-Pietro    Frozen meals with more than 500 mg of sodium per serving    Monosodium glutamate (MSG)    Processed meats (bologna, ham, marin) and canned meats (SPAM, corned beef)    Soups and bouillon (canned, frozen or dried)    Canned tomato products (juice, spaghetti sauce, etc.)    Sports drinks such as Gatorade or Powerade    Foods covered in sauce, gravy or other coatings (broccoli with cheese sauce, chicken fingers, etc.)    Biscuits and refrigerated rolls or breads         Patient Education     Understanding a Heart Murmur    The heart makes sounds as it beats. These sounds occur as the heart valves open and close to allow blood to flow through the heart. A heart murmur is an extra noise heard during a heartbeat. The noise is caused when blood does not flow smoothly through the heart. Heart murmurs can be innocent (harmless) or abnormal (caused by a heart problem).  What causes a heart murmur?  An innocent heart murmur can be a normal finding  for many people. It may also be caused by:    Fever    Exercise    Pregnancy    Anemia    Overactive thyroid gland  An abnormal heart murmur can be caused by heart problems such as:    A damaged or diseased valve. The valve may be too narrow for blood to flow through easily. Or it may have problems opening or closing, and may leak blood backward.    A hole in the heart (septal defect). This is a problem with the heart s structure that a person is born with (congenital). It causes blood to leak through the wall that normally divides the left and right sides of the heart.  What are the symptoms of a heart murmur?  Heart murmurs do not usually cause symptoms. They tend to be found when your healthcare provider is listening to your heart for another reason. People with an abnormal heart murmur may have symptoms of the problem causing the murmur. Symptoms can include:    Feeling weak or tired    Shortness of breath, especially with exercise    Chest pain    Fast, pounding, or skipping heartbeat    Swollen ankles, feet, abdomen    Feeling dizzy or faint    Poor feeding and failing to grow normally (babies only)  How is a heart murmur treated?  An innocent heart murmur does not usually need treatment unless there is a clear cause, such as anemia. In such cases, treating the underlying cause should cure the murmur. In some cases, an innocent heart murmur may go away on its own.  Treatment for an abnormal heart murmur depends on the cause. Options may include:    Medicines to help relieve symptoms    Procedures or surgery to fix or replace a diseased or damaged heart valve    Procedures or surgery to fix a hole in the heart  What are the complications of a heart murmur?  An innocent heart murmur has no complications. Complications of an abnormal heart murmur will vary depending on the cause. Possible complications include:    Heart failure. This problem occurs when the heart is so weak it no longer pumps blood  well.    Infection of the heart s valves or inner lining (infective endocarditis)    Blood clots and stroke    Fainting    Heart attack    Sudden cardiac arrest. This problem occurs when the heart suddenly stops beating.  When should I call my healthcare provider?  Call your healthcare provider right away if you have any of these:    Chest pain    Shortness of breath    Fever of 100.4 F (38 C) or higher, or as directed    Symptoms that don t get better with treatment, or symptoms that get worse    New symptoms   Date Last Reviewed: 5/1/2016 2000-2018 [employer]. [address] [place] [address]. All rights reserved. This information is not intended as a substitute for professional medical care. Always follow your healthcare professional's instructions.

## 2019-05-14 NOTE — LETTER
My Depression Action Plan  Name: Marina Escudero   Date of Birth 1955  Date: 5/14/2019    My doctor: Kd Gasca   My clinic: [address] [facility] [address]  [phone]          GREEN    ZONE   Good Control    What it looks like:     Things are going generally well. You have normal up s and down s. You may even feel depressed from time to time, but bad moods usually last less than a day.   What you need to do:  1. Continue to care for yourself (see self care plan)  2. Check your depression survival kit and update it as needed  3. Follow your physician s recommendations including any medication.  4. Do not stop taking medication unless you consult with your physician first.           YELLOW         ZONE Getting Worse    What it looks like:     Depression is starting to interfere with your life.     It may be hard to get out of bed; you may be starting to isolate yourself from others.    Symptoms of depression are starting to last most all day and this has happened for several days.     You may have suicidal thoughts but they are not constant.   What you need to do:     1. Call your care team, your response to treatment will improve if you keep your care team informed of your progress. Yellow periods are signs an adjustment may need to be made.     2. Continue your self-care, even if you have to fake it!    3. Talk to someone in your support network    4. Open up your depression survival kit           RED    ZONE Medical Alert - Get Help    What it looks like:     Depression is seriously interfering with your life.     You may experience these or other symptoms: You can t get out of bed most days, can t work or engage in other necessary activities, you have trouble taking care of basic hygiene, or basic responsibilities, thoughts of suicide or death that will not go away, self-injurious behavior.     What you need to do:  1. Call your care team  and request a same-day appointment. If they are not available (weekends or after hours) call your local crisis line, emergency room or 911.            Depression Self Care Plan / Survival Kit    Self-Care for Depression  Here s the deal. Your body and mind are really not as separate as most people think.  What you do and think affects how you feel and how you feel influences what you do and think. This means if you do things that people who feel good do, it will help you feel better.  Sometimes this is all it takes.  There is also a place for medication and therapy depending on how severe your depression is, so be sure to consult with your medical provider and/ or Behavioral Health Consultant if your symptoms are worsening or not improving.     In order to better manage my stress, I will:    Exercise  Get some form of exercise, every day. This will help reduce pain and release endorphins, the  feel good  chemicals in your brain. This is almost as good as taking antidepressants!  This is not the same as joining a gym and then never going! (they count on that by the way ) It can be as simple as just going for a walk or doing some gardening, anything that will get you moving.      Hygiene   Maintain good hygiene (Get out of bed in the morning, Make your bed, Brush your teeth, Take a shower, and Get dressed like you were going to work, even if you are unemployed).  If your clothes don't fit try to get ones that do.    Diet  I will strive to eat foods that are good for me, drink plenty of water, and avoid excessive sugar, caffeine, alcohol, and other mood-altering substances.  Some foods that are helpful in depression are: complex carbohydrates, B vitamins, flaxseed, fish or fish oil, fresh fruits and vegetables.    Psychotherapy  I agree to participate in Individual Therapy (if recommended).    Medication  If prescribed medications, I agree to take them.  Missing doses can result in serious side effects.  I understand  that drinking alcohol, or other illicit drug use, may cause potential side effects.  I will not stop my medication abruptly without first discussing it with my provider.    Staying Connected With Others  I will stay in touch with my friends, family members, and my primary care provider/team.    Use your imagination  Be creative.  We all have a creative side; it doesn t matter if it s oil painting, sand castles, or mud pies! This will also kick up the endorphins.    Witness Beauty  (AKA stop and smell the roses) Take a look outside, even in mid-winter. Notice colors, textures. Watch the squirrels and birds.     Service to others  Be of service to others.  There is always someone else in need.  By helping others we can  get out of ourselves  and remember the really important things.  This also provides opportunities for practicing all the other parts of the program.    Humor  Laugh and be silly!  Adjust your TV habits for less news and crime-drama and more comedy.    Control your stress  Try breathing deep, massage therapy, biofeedback, and meditation. Find time to relax each day.     My support system    Clinic Contact:  Phone number:    Contact 1:  Phone number:    Contact 2:  Phone number:    Mandaen/:  Phone number:    Therapist:  Phone number:    Local crisis center:    Phone number:    Other community support:  Phone number:

## 2019-08-01 ENCOUNTER — OFFICE VISIT (OUTPATIENT)
Dept: FAMILY MEDICINE | Facility: CLINIC | Age: 64
End: 2019-08-01
Payer: COMMERCIAL

## 2019-08-01 VITALS
BODY MASS INDEX: 31.46 KG/M2 | SYSTOLIC BLOOD PRESSURE: 135 MMHG | OXYGEN SATURATION: 96 % | HEART RATE: 82 BPM | WEIGHT: 192 LBS | TEMPERATURE: 97.8 F | DIASTOLIC BLOOD PRESSURE: 89 MMHG

## 2019-08-01 DIAGNOSIS — Z01.818 PREOP GENERAL PHYSICAL EXAM: Primary | ICD-10-CM

## 2019-08-01 DIAGNOSIS — M79.645 CHRONIC PAIN OF LEFT THUMB: ICD-10-CM

## 2019-08-01 DIAGNOSIS — G89.29 CHRONIC PAIN OF LEFT THUMB: ICD-10-CM

## 2019-08-01 LAB
ANION GAP SERPL CALCULATED.3IONS-SCNC: 9 MMOL/L (ref 3–14)
BUN SERPL-MCNC: 11 MG/DL (ref 7–30)
CALCIUM SERPL-MCNC: 9.4 MG/DL (ref 8.5–10.1)
CHLORIDE SERPL-SCNC: 104 MMOL/L (ref 94–109)
CO2 SERPL-SCNC: 24 MMOL/L (ref 20–32)
CREAT SERPL-MCNC: 0.71 MG/DL (ref 0.52–1.04)
GFR SERPL CREATININE-BSD FRML MDRD: 89 ML/MIN/{1.73_M2}
GLUCOSE SERPL-MCNC: 91 MG/DL (ref 70–99)
POTASSIUM SERPL-SCNC: 4.6 MMOL/L (ref 3.4–5.3)
SODIUM SERPL-SCNC: 137 MMOL/L (ref 133–144)

## 2019-08-01 PROCEDURE — 99214 OFFICE O/P EST MOD 30 MIN: CPT | Performed by: INTERNAL MEDICINE

## 2019-08-01 PROCEDURE — 36415 COLL VENOUS BLD VENIPUNCTURE: CPT | Performed by: INTERNAL MEDICINE

## 2019-08-01 PROCEDURE — 80048 BASIC METABOLIC PNL TOTAL CA: CPT | Performed by: INTERNAL MEDICINE

## 2019-08-01 NOTE — PROGRESS NOTES
"[facility]  [address] [address]  [phone]  Dept: [phone]    PRE-OP EVALUATION:  Today's date: 2019    Marina Escudero (: 1955) presents for pre-operative evaluation assessment as requested by Dr. Kaminski.  She requires evaluation and anesthesia risk assessment prior to undergoing surgery/procedure for treatment of chronic left thumb pain.    Proposed Surgery/ Procedure: Orthopedic \"LIft\" on left thumb  Date of Surgery/ Procedure: 19  Time of Surgery/ Procedure: 9 a.m.  Hospital/Surgical Facility: [facility]  Fax number for surgical facility: [phone]  Primary Physician: Kd Gasca  Type of Anesthesia Anticipated: General    Patient has a Health Care Directive or Living Will:  NO    1. NO - Do you have a history of heart attack, stroke, stent, bypass or surgery on an artery in the head, neck, heart or legs?  2. NO - Do you ever have any pain or discomfort in your chest?  3. NO - Do you have a history of  Heart Failure?  4. NO - Are you troubled by shortness of breath when: walking on the level, up a slight hill or at night?  5. YES - DO YOU CURRENTLY HAVE A COLD, BRONCHITIS OR OTHER RESPIRATORY INFECTION?   6. YES - DO YOU HAVE A COUGH, SHORTNESS OF BREATH OR WHEEZING?   7. NO - Do you sometimes get pains in the calves of your legs when you walk?  8. NO - Do you or anyone in your family have previous history of blood clots?  9. NO - Do you or does anyone in your family have a serious bleeding problem such as prolonged bleeding following surgeries or cuts?  10. NO - Have you ever had problems with anemia or been told to take iron pills?  11. NO - Have you had any abnormal blood loss such as black, tarry or bloody stools, or abnormal vaginal bleeding?  12. NO - Have you ever had a blood transfusion?  13. NO - Have you or any of your relatives ever had problems with anesthesia?  14. NO - Do you have sleep apnea, " excessive snoring or daytime drowsiness?  15. NO - Do you have any prosthetic heart valves?  16. NO - Do you have prosthetic joints?  17. NO - Is there any chance that you may be pregnant?      HPI:     HPI related to upcoming procedure:     Patient has been experiencing chronic and worsening pain at the base of her left thumb.  She had a similar problem with the right thumb in the past which resolved after surgical management.  The pain has been significantly affecting her daily activities so it was decided to proceed with surgical intervention.  Denies redness or swelling of the thumb joints.      MEDICAL HISTORY:     Patient Active Problem List    Diagnosis Date Noted     Hyperlipidemia LDL goal <130      Priority: Medium     Recurrent major depression in remission (H)      Priority: Medium     Pain in joint, lower leg 01/09/2015     Priority: Medium     Edema 01/09/2015     Priority: Medium     Tibia/fibula fracture 01/09/2015     Priority: Medium      Past Medical History:   Diagnosis Date     Heart murmur     since childhood, asymptomatic     Hyperlipidemia LDL goal <130      Recurrent major depression in remission (H)      Past Surgical History:   Procedure Laterality Date     ARTHROPLASTY CARPOMETACARPAL (THUMB JOINT)  6/11/2014    Procedure: ARTHROPLASTY CARPOMETACARPAL (THUMB JOINT);  Surgeon: Ariana Kaminski MD;  Location: [facility]     ENT SURGERY      rhinoplasty     GI SURGERY      laparoscopy     GYN SURGERY      c section     KNEE SURGERY Right     fracture; TRIA     Current Outpatient Medications   Medication Sig Dispense Refill     ASPIRIN PO Take 81 mg by mouth       BIOTIN 5000 PO        Coenzyme Q10 (CO Q 10) 10 MG CAPS        diclofenac (VOLTAREN) 1 % topical gel Place 2 g onto the skin 4 times daily 100 g 3     Flaxseed, Linseed, (FLAX SEED OIL) 1000 MG capsule Take 1 capsule by mouth daily       FLUoxetine (PROZAC) 20 MG capsule Take 1 capsule (20 mg) by mouth daily 90 capsule 3      Multiple Vitamins-Minerals (MULTIVITAMIN OR) Take 1 tablet by mouth daily       Omega-3 Fatty Acids (OMEGA-3 FISH OIL PO) Take 1 tablet by mouth daily       VITAMIN D, CHOLECALCIFEROL, PO Take by mouth daily       OTC products: no recent use of OTC ASA, NSAIDS or Steroids    No Known Allergies   Latex Allergy: NO    Social History     Tobacco Use     Smoking status: Never Smoker     Smokeless tobacco: Never Used   Substance Use Topics     Alcohol use: Yes     Alcohol/week: 0.0 oz     Comment: 2 drinks per week     History   Drug Use No       REVIEW OF SYSTEMS:   C: NEGATIVE for fever, chills, change in weight  E/M: NEGATIVE for ear, mouth and throat problems  R: NEGATIVE for significant cough or SOB  CV: NEGATIVE for chest pain, palpitations or peripheral edema  GI: NEGATIVE for nausea, abdominal pain, heartburn, or change in bowel habits  : NEGATIVE for frequency, dysuria, or hematuria  N: NEGATIVE for weakness, dizziness or paresthesias  H: NEGATIVE for bleeding problems      EXAM:   /89 (BP Location: Right arm, Cuff Size: Adult Regular)   Pulse 82   Temp 97.8  F (36.6  C) (Oral)   Wt 87.1 kg (192 lb)   SpO2 96%   BMI 31.46 kg/m      GENERAL APPEARANCE: healthy, alert and no distress    NECK: no adenopathy, no asymmetry, masses, or scars and thyroid normal to palpation    RESP: lungs clear to auscultation - no rales, rhonchi or wheezes    CV: regular rates and rhythm, normal S1 S2, no S3 or S4 and no murmur, click or rub    ABDOMEN:  soft, nontender, no HSM or masses and bowel sounds normal    NEURO: Normal strength and tone, sensory exam grossly normal, mentation intact and speech normal    PSYCH: mentation appears normal. and affect normal/bright    LYMPHATICS: No cervical or supraclavicular nodes      DIAGNOSTICS:     Labs Resulted Today:   Results for orders placed or performed in visit on 08/01/19   Basic metabolic panel   Result Value Ref Range    Sodium 137 133 - 144 mmol/L    Potassium 4.6 3.4  - 5.3 mmol/L    Chloride 104 94 - 109 mmol/L    Carbon Dioxide 24 20 - 32 mmol/L    Anion Gap 9 3 - 14 mmol/L    Glucose 91 70 - 99 mg/dL    Urea Nitrogen 11 7 - 30 mg/dL    Creatinine 0.71 0.52 - 1.04 mg/dL    GFR Estimate 89 >60 mL/min/[1.73_m2]    GFR Estimate If Black >90 >60 mL/min/[1.73_m2]    Calcium 9.4 8.5 - 10.1 mg/dL       Recent Labs   Lab Test 10/01/15  0836 11/14/13   HGB 13.2  --    POTASSIUM  --  4.6   CR  --  0.75        IMPRESSION:   Diagnosis/reason for consult:  chronic left thumb pain    The proposed surgical procedure is considered LOW risk.    REVISED CARDIAC RISK INDEX  The patient has the following serious cardiovascular risks for perioperative complications such as (MI, PE, VFib and 3  AV Block):  No serious cardiac risks  INTERPRETATION: 0 risks: Class I (very low risk - 0.4% complication rate)    The patient has the following additional risks for perioperative complications:  No identified additional risks      ICD-10-CM    1. Preop general physical exam Z01.818 Basic metabolic panel   2. Chronic pain of left thumb M79.645     G89.29        RECOMMENDATIONS:       Give anti-emetic postoperative given patient's history of experiencing side effect of nausea from anesthesia.      APPROVAL GIVEN to proceed with proposed procedure, without further diagnostic evaluation       Signed Electronically by: Kd Gasca MD    Copy of this evaluation report is provided to requesting physician.    [facility] Preop Guidelines    Revised Cardiac Risk Index

## 2019-08-29 ENCOUNTER — TRANSFERRED RECORDS (OUTPATIENT)
Dept: HEALTH INFORMATION MANAGEMENT | Facility: CLINIC | Age: 64
End: 2019-08-29

## 2019-10-01 ENCOUNTER — TRANSFERRED RECORDS (OUTPATIENT)
Dept: HEALTH INFORMATION MANAGEMENT | Facility: CLINIC | Age: 64
End: 2019-10-01

## 2019-10-21 NOTE — PROGRESS NOTES
Marina is a 64 year old  female who presents for annual exam.     Besides routine health maintenance, she would like to discuss some ear issues causing a little bit of imbalance.    Do you have a Health Care Directive?: No: Advance care planning reviewed with patient; information given to patient to review.    Fall risk:   Fallen 2 or more times in the past year?: No  Any fall with injury in the past year?: No    HPI:  Here today for yearly exam --doing well.  Postmenopausal.  No vb/spotting.  Denies hot flushes or night sweats.  Not currently SA and denies vaginal dryness or pain.  No bowel/bladder issues.  No leaking or incontinence issues.    ; 1 grown daughter.  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  -not as active as she would like; had thumb surgery in August and has been a bit disappointed with her recovery; meeting with [employer] in [place] this afternoon to come up with workout plan as this is covered with medicare!  +mammo already today; +SBE --no concerns  PCP --Kd Gasca MD --had preop visit with him this summer; hx elevated TGs and not fasting today but agrees to come back before the holidays  -will have dexa this year; last done in 2016 (spine -1.0, L hip -1.0, R hip -1.3)  -agrees to flu shot today    GYNECOLOGIC HISTORY:  No LMP recorded. Patient is postmenopausal..   reports that she has never smoked. She has never used smokeless tobacco.    Patient is not sexually active.  STD testing offered?  Declined  Last PHQ-9 score on record=   PHQ-9 SCORE 10/22/2019   PHQ-9 Total Score 6     Last GAD7 score on record=   JAY-7 SCORE 2017 10/17/2018 10/22/2019   Total Score 0 2 4     Alcohol Score = 2    HEALTH MAINTENANCE:  Cholesterol: 10/17/18   Total= 198, Triglycerides=161, HDL=56,  VOC=052, FBS=82 TSH=17-3.20 -patient is not fasting for labs today   Last Mammo: 10/17/18, Result: Normal, Next Mammo: Today   Pap: 5/10/16 neg  DEXA:  5/10/16  Colonoscopy:  8/17/15, Result:  Normal, Next Colonoscopy: 6 years.    Health maintenance updated:  yes    HISTORY:  OB History    Para Term  AB Living   1 1 1 0 0 0   SAB TAB Ectopic Multiple Live Births   0 0 0 0 0      # Outcome Date GA Lbr Sammy/2nd Weight Sex Delivery Anes PTL Lv   1 Term               Name: Sonia Aaron     Patient Active Problem List   Diagnosis     Pain in joint, lower leg     Edema     Tibia/fibula fracture     Hyperlipidemia LDL goal <130     Recurrent major depression in remission (H)     Past Surgical History:   Procedure Laterality Date     ARTHROPLASTY CARPOMETACARPAL (THUMB JOINT)  2014    Procedure: ARTHROPLASTY CARPOMETACARPAL (THUMB JOINT);  Surgeon: Ariana Kaminski MD;  Location: [facility]     ENT SURGERY      rhinoplasty     GI SURGERY      laparoscopy     GYN SURGERY      c section     HAND SURGERY  2019    left hand     KNEE SURGERY Right     fracture; [facility]      Social History     Tobacco Use     Smoking status: Never Smoker     Smokeless tobacco: Never Used   Substance Use Topics     Alcohol use: Yes     Alcohol/week: 0.0 standard drinks     Comment: 2 drinks per week      Problem (# of Occurrences) Relation (Name,Age of Onset)    Breast Cancer (1) Paternal Grandmother    Diabetes (1) Mother    Hypertension (1) Mother            Current Outpatient Medications   Medication Sig     ASPIRIN PO Take 81 mg by mouth     BIOTIN 5000 PO      Coenzyme Q10 (CO Q 10) 10 MG CAPS      diclofenac (VOLTAREN) 1 % topical gel Place 2 g onto the skin 4 times daily     Flaxseed, Linseed, (FLAX SEED OIL) 1000 MG capsule Take 1 capsule by mouth daily     FLUoxetine (PROZAC) 20 MG capsule Take 1 capsule (20 mg) by mouth daily     Multiple Vitamins-Minerals (MULTIVITAMIN OR) Take 1 tablet by mouth daily  "    Omega-3 Fatty Acids (OMEGA-3 FISH OIL PO) Take 1 tablet by mouth daily     VITAMIN D, CHOLECALCIFEROL, PO Take by mouth daily     No current facility-administered medications for this visit.        No Known Allergies    Past medical, surgical, social and family history were reviewed and updated in EPIC.    ROS:   12 point review of systems negative other than symptoms noted below.    EXAM:  /80   Pulse 85   Ht 1.664 m (5' 5.5\")   Wt 91.4 kg (201 lb 6.4 oz)   BMI 33.00 kg/m     BMI: Body mass index is 33 kg/m .    EXAM:  Constitutional: Appearance: Well nourished, well developed alert, in no acute distress  Neck:  Lymph Nodes:  No lymphadenopathy present    Thyroid:  Gland size normal, nontender, no nodules or masses present  on palpation  Chest:  Respiratory Effort:  Breathing unlabored  Cardiovascular:Heart    Auscultation:  Regular rate, normal rhythm, no murmurs present  Breasts: Inspection of Breasts:  No lymphadenopathy present., Palpation of Breasts and Axillae:  No masses present on palpation, no breast tenderness., Axillary Lymph Nodes:  No lymphadenopathy present. and No nodularity, asymmetry or nipple discharge bilaterally.  Gastrointestinal:  Abdominal Examination:  Abdomen nontender to palpation, tone normal without     rigidity or guarding, no masses present, umbilicus without lesions    Liver and speen:  No hepatomegaly present, liver nontender to palpation    Hernias:  No hernias present  Lymphatic: Lymph Nodes:  No other lymphadenopathy present  Skin:  General Inspection:  No rashes present, no lesions present, no areas of  discoloration.    Genitalia and Groin:  No rashes present, no lesions present, no areas of  discoloration, no masses present  Neurologic/Psychiatric:    Mental Status:  Oriented X3     Pelvic Exam:  External Genitalia:     Normal appearance for age, no discharge present, no tenderness present, no inflammatory lesions present, color normal  Vagina:     Normal vaginal " vault without central or paravaginal defects, no discharge present, no inflammatory lesions present, no masses present  Bladder:     Nontender to palpation  Urethra:   Urethral Body:  Urethra palpation normal, urethra structural support normal   Urethral Meatus:  No erythema or lesions present  Cervix:     Appearance healthy, no lesions present, nontender to palpation, no bleeding present  Uterus:     Uterus: firm, normal sized and nontender, midplane in position.   Adnexa:     No adnexal tenderness present, no adnexal masses present  Perineum:     Perineum within normal limits, no evidence of trauma, no rashes or skin lesions present  Anus:     Anus within normal limits, no hemorrhoids present  Inguinal Lymph Nodes:     No lymphadenopathy present  Pubic Hair:     Normal pubic hair distribution for age  Genitalia and Groin:     No rashes present, no lesions present, no areas of discoloration, no masses present      COUNSELING:   Reviewed preventive health counseling, as reflected in patient instructions  Special attention given to:       Regular exercise       Healthy diet/nutrition       Osteoporosis Prevention/Bone Health       ([place])menopause management    BMI:  Body mass index is 33 kg/m .  Weight management plan: Discussed healthy diet and exercise guidelines Patient was referred to their PCP to discuss a diet and exercise plan.   reports that she has never smoked. She has never used smokeless tobacco.      ASSESSMENT:  64 year old female with satisfactory annual exam.    ICD-10-CM    1. Encounter for gynecological examination without abnormal finding Z01.419 Pap imaged thin layer screen with HPV - recommended age 30 - 65     HPV High Risk Types DNA Cervical   2. Adjustment disorder with mixed anxiety and depressed mood F43.23 FLUoxetine (PROZAC) 20 MG capsule   3. Encounter for lipid screening for cardiovascular disease Z13.220 Lipid panel reflex to direct LDL Fasting    Z13.6    4. Screening for diabetes  mellitus Z13.1 Glucose, whole blood       PLAN:  Patient Instructions   Follow up with your primary care provider for your other medical problems.  Return to clinic for screening Lipids and Fasting Blood sugar.  Continue self breast exam.  Increase physical activity and exercise.  Lab and pap smear results will be called to the patient.  Co-testing done today and will discontinue if negative.  Usual safety and preventative measures counseling done.  BMI >25  Weight loss encouraged.  Flu Shot today.  Discussed Osteoporosis screening as well as calcium and Vitamin D recommendations.  Will arrange repeat bone scan this year.      Sonia Bowers MD

## 2019-10-22 ENCOUNTER — ANCILLARY PROCEDURE (OUTPATIENT)
Dept: MAMMOGRAPHY | Facility: CLINIC | Age: 64
End: 2019-10-22
Payer: COMMERCIAL

## 2019-10-22 ENCOUNTER — OFFICE VISIT (OUTPATIENT)
Dept: OBGYN | Facility: CLINIC | Age: 64
End: 2019-10-22
Payer: COMMERCIAL

## 2019-10-22 VITALS
WEIGHT: 201.4 LBS | DIASTOLIC BLOOD PRESSURE: 80 MMHG | BODY MASS INDEX: 32.37 KG/M2 | SYSTOLIC BLOOD PRESSURE: 122 MMHG | HEIGHT: 66 IN | HEART RATE: 85 BPM

## 2019-10-22 DIAGNOSIS — Z13.6 ENCOUNTER FOR LIPID SCREENING FOR CARDIOVASCULAR DISEASE: ICD-10-CM

## 2019-10-22 DIAGNOSIS — Z13.1 SCREENING FOR DIABETES MELLITUS: ICD-10-CM

## 2019-10-22 DIAGNOSIS — Z12.31 VISIT FOR SCREENING MAMMOGRAM: ICD-10-CM

## 2019-10-22 DIAGNOSIS — Z23 NEED FOR PROPHYLACTIC VACCINATION AND INOCULATION AGAINST INFLUENZA: ICD-10-CM

## 2019-10-22 DIAGNOSIS — Z13.220 ENCOUNTER FOR LIPID SCREENING FOR CARDIOVASCULAR DISEASE: ICD-10-CM

## 2019-10-22 DIAGNOSIS — M85.89 OSTEOPENIA OF MULTIPLE SITES: ICD-10-CM

## 2019-10-22 DIAGNOSIS — Z01.419 ENCOUNTER FOR GYNECOLOGICAL EXAMINATION WITHOUT ABNORMAL FINDING: Primary | ICD-10-CM

## 2019-10-22 DIAGNOSIS — F43.23 ADJUSTMENT DISORDER WITH MIXED ANXIETY AND DEPRESSED MOOD: ICD-10-CM

## 2019-10-22 DIAGNOSIS — M85.80 OSTEOPENIA, SENILE: ICD-10-CM

## 2019-10-22 PROCEDURE — G0008 ADMIN INFLUENZA VIRUS VAC: HCPCS | Performed by: OBSTETRICS & GYNECOLOGY

## 2019-10-22 PROCEDURE — G0145 SCR C/V CYTO,THINLAYER,RESCR: HCPCS | Performed by: OBSTETRICS & GYNECOLOGY

## 2019-10-22 PROCEDURE — 90662 IIV NO PRSV INCREASED AG IM: CPT | Performed by: OBSTETRICS & GYNECOLOGY

## 2019-10-22 PROCEDURE — 99397 PER PM REEVAL EST PAT 65+ YR: CPT | Mod: 25 | Performed by: OBSTETRICS & GYNECOLOGY

## 2019-10-22 PROCEDURE — 87624 HPV HI-RISK TYP POOLED RSLT: CPT | Performed by: OBSTETRICS & GYNECOLOGY

## 2019-10-22 PROCEDURE — 77067 SCR MAMMO BI INCL CAD: CPT | Mod: TC

## 2019-10-22 ASSESSMENT — ANXIETY QUESTIONNAIRES
IF YOU CHECKED OFF ANY PROBLEMS ON THIS QUESTIONNAIRE, HOW DIFFICULT HAVE THESE PROBLEMS MADE IT FOR YOU TO DO YOUR WORK, TAKE CARE OF THINGS AT HOME, OR GET ALONG WITH OTHER PEOPLE: NOT DIFFICULT AT ALL
1. FEELING NERVOUS, ANXIOUS, OR ON EDGE: SEVERAL DAYS
GAD7 TOTAL SCORE: 4
6. BECOMING EASILY ANNOYED OR IRRITABLE: SEVERAL DAYS
3. WORRYING TOO MUCH ABOUT DIFFERENT THINGS: SEVERAL DAYS
5. BEING SO RESTLESS THAT IT IS HARD TO SIT STILL: NOT AT ALL
2. NOT BEING ABLE TO STOP OR CONTROL WORRYING: NOT AT ALL
7. FEELING AFRAID AS IF SOMETHING AWFUL MIGHT HAPPEN: NOT AT ALL

## 2019-10-22 ASSESSMENT — PATIENT HEALTH QUESTIONNAIRE - PHQ9
SUM OF ALL RESPONSES TO PHQ QUESTIONS 1-9: 6
5. POOR APPETITE OR OVEREATING: SEVERAL DAYS

## 2019-10-22 ASSESSMENT — MIFFLIN-ST. JEOR: SCORE: 1467.35

## 2019-10-22 NOTE — PATIENT INSTRUCTIONS
Follow up with your primary care provider for your other medical problems.  Return to clinic for screening Lipids and Fasting Blood sugar.  Continue self breast exam.  Increase physical activity and exercise.  Lab and pap smear results will be called to the patient.  Co-testing done today and will discontinue if negative.  Usual safety and preventative measures counseling done.  BMI >25  Weight loss encouraged.  Flu Shot today.  Discussed Osteoporosis screening as well as calcium and Vitamin D recommendations.  Will arrange repeat bone scan this year.

## 2019-10-23 DIAGNOSIS — F43.23 ADJUSTMENT DISORDER WITH MIXED ANXIETY AND DEPRESSED MOOD: ICD-10-CM

## 2019-10-23 ASSESSMENT — ANXIETY QUESTIONNAIRES: GAD7 TOTAL SCORE: 4

## 2019-10-23 NOTE — TELEPHONE ENCOUNTER
"Requested Prescriptions   Pending Prescriptions Disp Refills     FLUoxetine (PROZAC) 20 MG capsule [Pharmacy Med Name: FLUOXETINE HCL 20MG CAPS] 90 capsule 3     Sig: TAKE ONE CAPSULE BY MOUTH EVERY DAY       SSRIs Protocol Passed - 10/23/2019  4:21 AM        Passed - PHQ-9 score less than 5 in past 6 months     Please review last PHQ-9 score.           Passed - Medication is active on med list        Passed - Patient is age 18 or older        Passed - No active pregnancy on record        Passed - No positive pregnancy test in last 12 months        Passed - Recent (6 mo) or future (30 days) visit within the authorizing provider's specialty     Patient had office visit in the last 6 months or has a visit in the next 30 days with authorizing provider or within the authorizing provider's specialty.  See \"Patient Info\" tab in inbasket, or \"Choose Columns\" in Meds & Orders section of the refill encounter.            Pharmacy change, rx sent  Sandra Balderas RN on 10/23/2019 at 7:50 AM    "

## 2019-10-24 LAB
COPATH REPORT: NORMAL
PAP: NORMAL

## 2019-10-28 LAB
FINAL DIAGNOSIS: NORMAL
HPV HR 12 DNA CVX QL NAA+PROBE: NEGATIVE
HPV16 DNA SPEC QL NAA+PROBE: NEGATIVE
HPV18 DNA SPEC QL NAA+PROBE: NEGATIVE
SPECIMEN DESCRIPTION: NORMAL
SPECIMEN SOURCE CVX/VAG CYTO: NORMAL

## 2019-12-10 ENCOUNTER — ANCILLARY PROCEDURE (OUTPATIENT)
Dept: BONE DENSITY | Facility: CLINIC | Age: 64
End: 2019-12-10
Payer: COMMERCIAL

## 2019-12-10 ENCOUNTER — TELEPHONE (OUTPATIENT)
Dept: OBGYN | Facility: CLINIC | Age: 64
End: 2019-12-10

## 2019-12-10 DIAGNOSIS — Z13.6 ENCOUNTER FOR LIPID SCREENING FOR CARDIOVASCULAR DISEASE: ICD-10-CM

## 2019-12-10 DIAGNOSIS — M85.89 OSTEOPENIA OF MULTIPLE SITES: ICD-10-CM

## 2019-12-10 DIAGNOSIS — Z13.220 ENCOUNTER FOR LIPID SCREENING FOR CARDIOVASCULAR DISEASE: ICD-10-CM

## 2019-12-10 DIAGNOSIS — Z13.1 SCREENING FOR DIABETES MELLITUS: ICD-10-CM

## 2019-12-10 LAB
CHOLEST SERPL-MCNC: 280 MG/DL
GLUCOSE BLD-MCNC: 84 MG/DL (ref 70–99)
HDLC SERPL-MCNC: 52 MG/DL
LDLC SERPL CALC-MCNC: 191 MG/DL
NONHDLC SERPL-MCNC: 228 MG/DL
TRIGL SERPL-MCNC: 187 MG/DL

## 2019-12-10 PROCEDURE — 82947 ASSAY GLUCOSE BLOOD QUANT: CPT | Performed by: OBSTETRICS & GYNECOLOGY

## 2019-12-10 PROCEDURE — 77080 DXA BONE DENSITY AXIAL: CPT | Performed by: OBSTETRICS & GYNECOLOGY

## 2019-12-10 PROCEDURE — 80061 LIPID PANEL: CPT | Performed by: OBSTETRICS & GYNECOLOGY

## 2019-12-10 PROCEDURE — 36415 COLL VENOUS BLD VENIPUNCTURE: CPT | Performed by: OBSTETRICS & GYNECOLOGY

## 2019-12-10 NOTE — RESULT ENCOUNTER NOTE
Please inform of results --cholesterol very elevated this year --total and LDL cholesterol much above the recommended limits; triglycerides also quite elevated.  Needs to see and discuss with PCP.  Numbers are in the range that she needs to consider starting a medication which he will discuss.  Fasting blood sugar normal.

## 2019-12-10 NOTE — LETTER
[facility]  [address] [place] [id number]  Phone: [phone]  Fax: [phone]      Marina Escudero     Date:  12/10/2019   [address]  [place] [address]      Thank you for having your DEXA scan performed.  As you recall, the DEXA scan evaluates the strength of your bones.  It is important to know the strength of your bones to help you avoid breaking bones in the future.    A T score shows your risk for breaking a bone.  Normal bone has a T score of -0.9 or higher.  Some bone thinning (osteopenia) has a T score between -1.0 and -2.4.  A lot of bone thinning (osteoporosis) has a T score of -2.5 or lower.    Your T scores on 12/10/2019 were:     Left Hip:  -1.5 score    Right Hip:  -1.6 score    This T score shows you have some thinning (Osteopenia) Compared with your previous scan, this shows more bone loss.    Spine:  -1.1 score      This T score shows you have some thinning (Osteopenia) Compared with your previous scan, this shows no major change.      The good news is that there are treatments for making your bones stronger, if you need them.    Ways you can make your bones stronger:    Weight bearing exercises such as walking.  Eat three servings of dairy a day, or take calcium each day (3510-7103 mg).  Take Vitamin D each day (1,000-2,000 IU).    For more information, please make an appointment with your Primary Care Provider, request a brochure from our office, or look on our website and go to the Bone Density page.     No need to make an appointment.    Marina, continue Vitamin D, Calcium and exercise.    Overall your bones look quite good with only a small amount of bone loss since 2016 which is likely age related.  We will plan to repeat your bone scan again in 2 years.    Thank you.    Sonia Bowers MD

## 2020-05-11 ENCOUNTER — TELEPHONE (OUTPATIENT)
Dept: FAMILY MEDICINE | Facility: CLINIC | Age: 65
End: 2020-05-11

## 2020-05-11 ENCOUNTER — VIRTUAL VISIT (OUTPATIENT)
Dept: FAMILY MEDICINE | Facility: CLINIC | Age: 65
End: 2020-05-11
Payer: COMMERCIAL

## 2020-05-11 ENCOUNTER — E-VISIT (OUTPATIENT)
Dept: FAMILY MEDICINE | Facility: CLINIC | Age: 65
End: 2020-05-11

## 2020-05-11 DIAGNOSIS — J01.00 ACUTE NON-RECURRENT MAXILLARY SINUSITIS: Primary | ICD-10-CM

## 2020-05-11 DIAGNOSIS — Z53.9 ERRONEOUS ENCOUNTER--DISREGARD: Primary | ICD-10-CM

## 2020-05-11 PROCEDURE — 99213 OFFICE O/P EST LOW 20 MIN: CPT | Mod: 95 | Performed by: INTERNAL MEDICINE

## 2020-05-11 RX ORDER — AZITHROMYCIN 250 MG/1
TABLET, FILM COATED ORAL
Qty: 6 TABLET | Refills: 0 | Status: SHIPPED | OUTPATIENT
Start: 2020-05-11 | End: 2020-05-16

## 2020-05-11 ASSESSMENT — PATIENT HEALTH QUESTIONNAIRE - PHQ9: SUM OF ALL RESPONSES TO PHQ QUESTIONS 1-9: 0

## 2020-05-11 NOTE — TELEPHONE ENCOUNTER
Patient sent an E-Visit for URI symptoms but I need to speak with her for further clarification of symptoms.  Please schedule telephone visit.

## 2020-05-11 NOTE — PROGRESS NOTES
"Marina Escudero is a 64 year old female who is being evaluated via a billable telephone visit.      The patient has been notified of following:     \"This telephone visit will be conducted via a call between you and your physician/provider. We have found that certain health care needs can be provided without the need for a physical exam.  This service lets us provide the care you need with a short phone conversation.  If a prescription is necessary we can send it directly to your pharmacy.  If lab work is needed we can place an order for that and you can then stop by our lab to have the test done at a later time.    Telephone visits are billed at different rates depending on your insurance coverage. During this emergency period, for some insurers they may be billed the same as an in-person visit.  Please reach out to your insurance provider with any questions.    If during the course of the call the physician/provider feels a telephone visit is not appropriate, you will not be charged for this service.\"    Patient has given verbal consent for Telephone visit?  Yes    What phone number would you like to be contacted at? [phone]        Subjective     Marina Escudero is a 64 year old female who presents to clinic today for the following health issues:    HPI     Persistent maxillary pain and congestion      Review of Systems   Constitutional: Negative for fatigue.   Eyes: Negative for visual disturbance.   Respiratory: Negative for shortness of breath.    Cardiovascular: Negative for chest pain, palpitations and leg swelling.   Gastrointestinal: Negative for abdominal pain, nausea and vomiting.   Neurological: Negative for dizziness, weakness, light-headedness, numbness and headaches.           ICD-10-CM    1. Acute non-recurrent maxillary sinusitis  J01.00 azithromycin (ZITHROMAX) 250 MG tablet         Total time spent with the patient over the phone was 6 minutes.      Dr. Kd Gasca    "

## 2020-05-13 ASSESSMENT — ENCOUNTER SYMPTOMS
HEADACHES: 0
DIZZINESS: 0
LIGHT-HEADEDNESS: 0
FATIGUE: 0
PALPITATIONS: 0
SHORTNESS OF BREATH: 0
WEAKNESS: 0
NAUSEA: 0
VOMITING: 0
NUMBNESS: 0
ABDOMINAL PAIN: 0

## 2020-10-28 DIAGNOSIS — Z12.31 VISIT FOR SCREENING MAMMOGRAM: ICD-10-CM

## 2020-11-03 DIAGNOSIS — F43.23 ADJUSTMENT DISORDER WITH MIXED ANXIETY AND DEPRESSED MOOD: ICD-10-CM

## 2020-11-03 ASSESSMENT — ACTIVITIES OF DAILY LIVING (ADL): CURRENT_FUNCTION: NO ASSISTANCE NEEDED

## 2020-11-03 NOTE — TELEPHONE ENCOUNTER
"Requested Prescriptions   Pending Prescriptions Disp Refills     FLUoxetine (PROZAC) 20 MG capsule 90 capsule 3     Sig: Take 1 capsule (20 mg) by mouth daily       SSRIs Protocol Failed - 11/3/2020  2:50 PM        Failed - Recent (6 mo) or future (30 days) visit within the authorizing provider's specialty     Patient had office visit in the last 6 months or has a visit in the next 30 days with authorizing provider or within the authorizing provider's specialty.  See \"Patient Info\" tab in inbasket, or \"Choose Columns\" in Meds & Orders section of the refill encounter.            Passed - PHQ-9 score less than 5 in past 6 months     Please review last PHQ-9 score.           Passed - Medication is active on med list        Passed - Patient is age 18 or older        Passed - No active pregnancy on record        Passed - No positive pregnancy test in last 12 months           Last Written Prescription Date:  10/23/19  Last Fill Quantity: 90,  # refills: 3   Last office visit: 10/22/2019 with prescribing provider:  Dr Bowers   Future Office Visit:   Next 5 appointments (look out 90 days)    Nov 04, 2020 10:30 AM  PHYSICAL with Tran Gayle MD  [facility] ([facility]) [address] [facility] [address] [phone]           Refused this refill request - will address at annual tomorrow  Fabiola Cottrell RN on 11/3/2020 at 2:52 PM        "

## 2020-11-04 ENCOUNTER — OFFICE VISIT (OUTPATIENT)
Dept: FAMILY MEDICINE | Facility: CLINIC | Age: 65
End: 2020-11-04
Payer: COMMERCIAL

## 2020-11-04 VITALS
BODY MASS INDEX: 32.18 KG/M2 | DIASTOLIC BLOOD PRESSURE: 107 MMHG | WEIGHT: 205 LBS | TEMPERATURE: 98.2 F | HEART RATE: 78 BPM | HEIGHT: 67 IN | SYSTOLIC BLOOD PRESSURE: 150 MMHG | OXYGEN SATURATION: 97 %

## 2020-11-04 DIAGNOSIS — E78.5 HYPERLIPIDEMIA LDL GOAL <130: ICD-10-CM

## 2020-11-04 DIAGNOSIS — M85.852 OSTEOPENIA OF BOTH HIPS: ICD-10-CM

## 2020-11-04 DIAGNOSIS — Z00.00 ROUTINE HISTORY AND PHYSICAL EXAMINATION OF ADULT: Primary | ICD-10-CM

## 2020-11-04 DIAGNOSIS — Z00.00 MEDICARE ANNUAL WELLNESS VISIT, INITIAL: ICD-10-CM

## 2020-11-04 DIAGNOSIS — M85.851 OSTEOPENIA OF BOTH HIPS: ICD-10-CM

## 2020-11-04 DIAGNOSIS — F33.40 RECURRENT MAJOR DEPRESSION IN REMISSION (H): ICD-10-CM

## 2020-11-04 DIAGNOSIS — R03.0 ELEVATED BLOOD PRESSURE READING WITHOUT DIAGNOSIS OF HYPERTENSION: ICD-10-CM

## 2020-11-04 DIAGNOSIS — F43.23 ADJUSTMENT DISORDER WITH MIXED ANXIETY AND DEPRESSED MOOD: ICD-10-CM

## 2020-11-04 DIAGNOSIS — Z23 NEED FOR VACCINATION: ICD-10-CM

## 2020-11-04 LAB
ERYTHROCYTE [DISTWIDTH] IN BLOOD BY AUTOMATED COUNT: 13.8 % (ref 10–15)
HCT VFR BLD AUTO: 39.3 % (ref 35–47)
HGB BLD-MCNC: 13 G/DL (ref 11.7–15.7)
MCH RBC QN AUTO: 28.8 PG (ref 26.5–33)
MCHC RBC AUTO-ENTMCNC: 33.1 G/DL (ref 31.5–36.5)
MCV RBC AUTO: 87 FL (ref 78–100)
PLATELET # BLD AUTO: 301 10E9/L (ref 150–450)
RBC # BLD AUTO: 4.51 10E12/L (ref 3.8–5.2)
WBC # BLD AUTO: 7.6 10E9/L (ref 4–11)

## 2020-11-04 PROCEDURE — 96127 BRIEF EMOTIONAL/BEHAV ASSMT: CPT | Performed by: INTERNAL MEDICINE

## 2020-11-04 PROCEDURE — G0009 ADMIN PNEUMOCOCCAL VACCINE: HCPCS | Performed by: INTERNAL MEDICINE

## 2020-11-04 PROCEDURE — 85027 COMPLETE CBC AUTOMATED: CPT | Performed by: INTERNAL MEDICINE

## 2020-11-04 PROCEDURE — 36415 COLL VENOUS BLD VENIPUNCTURE: CPT | Performed by: INTERNAL MEDICINE

## 2020-11-04 PROCEDURE — 84443 ASSAY THYROID STIM HORMONE: CPT | Performed by: INTERNAL MEDICINE

## 2020-11-04 PROCEDURE — 80061 LIPID PANEL: CPT | Performed by: INTERNAL MEDICINE

## 2020-11-04 PROCEDURE — 99397 PER PM REEVAL EST PAT 65+ YR: CPT | Mod: 25 | Performed by: INTERNAL MEDICINE

## 2020-11-04 PROCEDURE — 90732 PPSV23 VACC 2 YRS+ SUBQ/IM: CPT | Performed by: INTERNAL MEDICINE

## 2020-11-04 PROCEDURE — 80053 COMPREHEN METABOLIC PANEL: CPT | Performed by: INTERNAL MEDICINE

## 2020-11-04 PROCEDURE — 99213 OFFICE O/P EST LOW 20 MIN: CPT | Mod: 25 | Performed by: INTERNAL MEDICINE

## 2020-11-04 PROCEDURE — 82306 VITAMIN D 25 HYDROXY: CPT | Performed by: INTERNAL MEDICINE

## 2020-11-04 ASSESSMENT — PATIENT HEALTH QUESTIONNAIRE - PHQ9: 5. POOR APPETITE OR OVEREATING: NOT AT ALL

## 2020-11-04 ASSESSMENT — MIFFLIN-ST. JEOR: SCORE: 1495.11

## 2020-11-04 ASSESSMENT — ANXIETY QUESTIONNAIRES
7. FEELING AFRAID AS IF SOMETHING AWFUL MIGHT HAPPEN: SEVERAL DAYS
2. NOT BEING ABLE TO STOP OR CONTROL WORRYING: NOT AT ALL
GAD7 TOTAL SCORE: 3
3. WORRYING TOO MUCH ABOUT DIFFERENT THINGS: NOT AT ALL
6. BECOMING EASILY ANNOYED OR IRRITABLE: SEVERAL DAYS
IF YOU CHECKED OFF ANY PROBLEMS ON THIS QUESTIONNAIRE, HOW DIFFICULT HAVE THESE PROBLEMS MADE IT FOR YOU TO DO YOUR WORK, TAKE CARE OF THINGS AT HOME, OR GET ALONG WITH OTHER PEOPLE: NOT DIFFICULT AT ALL
1. FEELING NERVOUS, ANXIOUS, OR ON EDGE: SEVERAL DAYS
5. BEING SO RESTLESS THAT IT IS HARD TO SIT STILL: NOT AT ALL

## 2020-11-04 ASSESSMENT — ACTIVITIES OF DAILY LIVING (ADL): CURRENT_FUNCTION: NO ASSISTANCE NEEDED

## 2020-11-04 NOTE — PROGRESS NOTES
SUBJECTIVE:   Marina Escudero is a 65 year old female who presents for Preventive Visit.    Patient presenting for annual wellness and physical and to discuss some ongoing chronic medical problems including depression; she is maintained on fluoxetine 20 mg symptoms for years.  Has no particular concerns, she is doing really well on the Prozac, occasional minor anxiety.  She had a bone densitometry showed osteopenia in the hips and the lumbar spine and normal mammogram.  She does do a lot of physical activity at home and yard work, she does work out on stationary bike.  Denies any chest pain ,shortness of breath, difficulty breathing ,palpitations ,presyncope or syncope. she was told she had a murmur when she was young.  She had surgery to bilateral thumbs , no residual pain, she has some Heberden nodes in left hand.  She denies any musculoskeletal pains or aches, no leg swelling, occasional insomnia.  She denies any apneas, she is not known to have hypertension with some blood pressure were elevated in the clinic; today blood pressure was elevated , she feels that she is worked up with the [employer] going on.  She does not smoke, her father was a smoker and had heart disease.  She had a high LDL of 191 with labs in December 2019.  She was put on atorvastatin in the past , she reports could not tolerate because of severe muscle aches.  She does take low dose aspirin and also coenzyme Q 10.  She goes visits to Derm doctor once a year and she follows with an optometrist ; denies any cataracts or glaucoma.  Occasional sinus headaches; frontal, no breathing issues.  No GI or  symptoms, her colonoscopy is up-to-date last done 2016 she was told was normal  Patient has been advised of split billing requirements and indicates understanding: Yes   Are you in the first 12 months of your Medicare coverage?  No    Healthy Habits:     In general, how would you rate your overall health?  Excellent    Frequency of exercise:  2-3  "days/week    Duration of exercise:  30-45 minutes    Do you usually eat at least 4 servings of fruit and vegetables a day, include whole grains    & fiber and avoid regularly eating high fat or \"junk\" foods?  No    Taking medications regularly:  Yes    Medication side effects:  None    Ability to successfully perform activities of daily living:  No assistance needed    Home Safety:  No safety concerns identified    Hearing Impairment:  No hearing concerns    In the past 6 months, have you been bothered by leaking of urine?  No    In general, how would you rate your overall mental or emotional health?  Good      PHQ-2 Total Score: 1    Do you feel safe in your environment? Yes    Have you ever done Advance Care Planning? (For example, a Health Directive, POLST, or a discussion with a medical provider or your loved ones about your wishes): Yes, patient states has an Advance Care Planning document and will bring a copy to the clinic.      Fall risk  Fallen 2 or more times in the past year?: No  Any fall with injury in the past year?: No    Cognitive Screening   1) Repeat 3 items (Leader, Season, Table)    2) Clock draw: NORMAL  3) 3 item recall: Recalls 3 objects  Results: 3 items recalled: COGNITIVE IMPAIRMENT LESS LIKELY    Mini-CogTM Copyright S [employer]. Licensed by the author for use in [facility]; reprinted with permission (somarilu@.[email or web address]). All rights reserved.      Do you have sleep apnea, excessive snoring or daytime drowsiness?: no    Reviewed and updated as needed this visit by clinical staff  Tobacco  Allergies  Meds  Problems  Med Hx  Surg Hx  Fam Hx          Reviewed and updated as needed this visit by Provider  Tobacco  Allergies  Meds  Problems  Med Hx  Surg Hx  Fam Hx         Social History     Tobacco Use     Smoking status: Former Smoker     Packs/day: 0.50     Years: 5.00     Pack years: 2.50     Types: Cigarettes     Start date: 9/19/1977     Quit date: 9/23/1979     Years " since quittin.1     Smokeless tobacco: Never Used     Tobacco comment: less than 1/2 pack - social only   Substance Use Topics     Alcohol use: Yes     Alcohol/week: 0.0 standard drinks     Comment: 2 drinks per week     If you drink alcohol do you typically have >3 drinks per day or >7 drinks per week? No    Alcohol Use 2020   Prescreen: >3 drinks/day or >7 drinks/week? -   Prescreen: >3 drinks/day or >7 drinks/week? No           Current providers sharing in care for this patient include:     Patient Care Team:  Tran Gayle MD as PCP - General (Internal Medicine)  Kd Gasca MD as Assigned PCP  Sonia Bowers MD as Assigned OBGYN Provider    The following health maintenance items are reviewed in Epic and correct as of today:  Health Maintenance   Topic Date Due     HEPATITIS C SCREENING  1972     ZOSTER IMMUNIZATION (1 of 2) 2004     PHQ-9  2020     MAMMO SCREENING  10/28/2021     MEDICARE ANNUAL WELLNESS VISIT  2021     FALL RISK ASSESSMENT  2021     DTAP/TDAP/TD IMMUNIZATION (2 - Td) 07/10/2022     LIPID  2025     ADVANCE CARE PLANNING  2025     COLORECTAL CANCER SCREENING  2026     DEXA  Completed     DEPRESSION ACTION PLAN  Completed     INFLUENZA VACCINE  Completed     Pneumococcal Vaccine: 65+ Years  Completed     Pneumococcal Vaccine: Pediatrics (0 to 5 Years) and At-Risk Patients (6 to 64 Years)  Aged Out     IPV IMMUNIZATION  Aged Out     MENINGITIS IMMUNIZATION  Aged Out     HEPATITIS B IMMUNIZATION  Aged Out     Lab work is in process  Labs reviewed in EPIC  BP Readings from Last 3 Encounters:   20 (!) 150/107   10/22/19 122/80   19 135/89    Wt Readings from Last 3 Encounters:   20 93 kg (205 lb)   10/22/19 91.4 kg (201 lb 6.4 oz)   19 87.1 kg (192 lb)                  Patient Active Problem List   Diagnosis     Pain in joint, lower leg     Edema     Tibia/fibula fracture      Hyperlipidemia LDL goal <130     Recurrent major depression in remission (H)     Screening for cervical cancer     Past Surgical History:   Procedure Laterality Date     ABDOMEN SURGERY      laparoscopy for scar tissue adhesions     ARTHROPLASTY CARPOMETACARPAL (THUMB JOINT)  2014    Procedure: ARTHROPLASTY CARPOMETACARPAL (THUMB JOINT);  Surgeon: Ariana Kaminski MD;  Location[facility]     ENT SURGERY      rhinoplasty     GI SURGERY      laparoscopy     GYN SURGERY      c section     HAND SURGERY  2019    left hand     KNEE SURGERY Right     fracture; TRIA       Social History     Tobacco Use     Smoking status: Former Smoker     Packs/day: 0.50     Years: 5.00     Pack years: 2.50     Types: Cigarettes     Start date: 1977     Quit date: 1979     Years since quittin.1     Smokeless tobacco: Never Used     Tobacco comment: less than 1/2 pack - social only   Substance Use Topics     Alcohol use: Yes     Alcohol/week: 0.0 standard drinks     Comment: 2 drinks per week     Family History   Problem Relation Age of Onset     Hypertension Mother         diest at 82,      Diabetes Mother      Breast Cancer Paternal Grandmother      Cancer Father      Coronary Artery Disease Father         at age 69, was smoker, had laryngectomy         Current Outpatient Medications   Medication Sig Dispense Refill     ASPIRIN PO Take 81 mg by mouth       BIOTIN 5000 PO        Coenzyme Q10 (CO Q 10) 10 MG CAPS        diclofenac (VOLTAREN) 1 % topical gel Place 2 g onto the skin 4 times daily 100 g 3     Flaxseed, Linseed, (FLAX SEED OIL) 1000 MG capsule Take 1 capsule by mouth daily       FLUoxetine (PROZAC) 20 MG capsule Take 1 capsule (20 mg) by mouth daily 90 capsule 3     Multiple Vitamins-Minerals (MULTIVITAMIN OR) Take 1 tablet by mouth daily       Omega-3 Fatty Acids (OMEGA-3 FISH OIL PO) Take 1 tablet by mouth daily       VITAMIN D, CHOLECALCIFEROL, PO Take by mouth daily       losartan  "(COZAAR) 25 MG tablet Take 1 tablet (25 mg) by mouth daily 90 tablet 0     rosuvastatin (CRESTOR) 20 MG tablet Take 1 tablet (20 mg) by mouth daily 90 tablet 0     Allergies   Allergen Reactions     No Known Allergies      Recent Labs   Lab Test 11/04/20  1142 12/10/19  0808 08/01/19  1037 10/17/18  0924 09/08/17  1006 11/14/13  0000 11/14/13 11/12/12  0000 11/12/12   * 191*  --  110* 125*   < > 103  --  100   HDL 51 52  --  56 46*   < > 56  --  49   TRIG 269* 187*  --  161* 214*   < > 221  --  163   ALT 35  --   --   --   --   --  69  --  45   CR 0.66  --  0.71  --   --   --  0.75   < >  --    GFRESTIMATED >90  --  89  --   --   --   --   --   --    GFRESTBLACK >90  --  >90  --   --   --   --   --   --    POTASSIUM 4.1  --  4.6  --   --   --  4.6   < >  --    TSH 3.54  --   --   --  3.20  --   --   --   --     < > = values in this interval not displayed.      Pneumonia Vaccine:Adults age 65+ who received Pneumovax (PPSV23) at 65 years or older: Should be given PCV13 > 1 year after their most recent PPSV23  Mammogram Screening: Mammogram Screening: Patient over age 50, mutual decision to screen reflected in health maintenance.  Last 3 Pap and HPV Results:   PAP / HPV Latest Ref Rng & Units 10/22/2019 5/10/2016   PAP - NIL NIL   HPV 16 DNA NEG:Negative Negative -   HPV 18 DNA NEG:Negative Negative -   OTHER HR HPV NEG:Negative Negative -       Review of Systems  Constitutional, HEENT, cardiovascular, pulmonary, GI, , musculoskeletal, neuro, skin, endocrine and psych systems are negative, except as otherwise noted.    OBJECTIVE:   BP (!) 150/107 (BP Location: Right arm, Patient Position: Sitting)   Pulse 78   Temp 98.2  F (36.8  C) (Tympanic)   Ht 1.69 m (5' 6.54\")   Wt 93 kg (205 lb)   SpO2 97%   BMI 32.56 kg/m   Estimated body mass index is 32.56 kg/m  as calculated from the following:    Height as of this encounter: 1.69 m (5' 6.54\").    Weight as of this encounter: 93 kg (205 lb).  Physical " Exam  GENERAL: healthy, alert and no distress  EYES: Eyes grossly normal to inspection, PERRL and conjunctivae and sclerae normal  HENT: ear canals and TM's normal, nose and mouth without ulcers or lesions  NECK: no adenopathy, no asymmetry, masses, or scars and thyroid normal to palpation  RESP: lungs clear to auscultation - no rales, rhonchi or wheezes  CV: regular rate and rhythm, normal S1 S2, no S3 or S4, grade 1 systolic murmur left lower sternal border, no click or rub, no peripheral edema and peripheral pulses strong  ABDOMEN: soft, nontender, no hepatosplenomegaly, no masses and bowel sounds normal  MS: no gross musculoskeletal defects noted, no edema  SKIN: no suspicious lesions or rashes  NEURO: Normal strength and tone, mentation intact and speech normal  PSYCH: mentation appears normal, affect normal/bright    Diagnostic Test Results:  Labs reviewed in Epic    ASSESSMENT / PLAN:   Marina was seen today for physical.    Diagnoses and all orders for this visit:    Routine history and physical examination of adult    Medicare annual wellness visit, initial    Adjustment disorder with mixed anxiety and depressed mood  -     FLUoxetine (PROZAC) 20 MG capsule; Take 1 capsule (20 mg) by mouth daily  -     Comprehensive metabolic panel (BMP + Alb, Alk Phos, ALT, AST, Total. Bili, TP)    Recurrent major depression in remission (H)    Osteopenia of both hips  -     Vitamin D Deficiency  -     CBC with platelets    Hyperlipidemia LDL goal <130  -     Lipid panel reflex to direct LDL Fasting  -     CT Coronary Calcium Scan; Future  -     TSH with free T4 reflex    Need for vaccination  -     Pneumococcal vaccine 23 valent PPSV23  (Pneumovax) [44578]  -     ADMIN MEDICARE: Pneumococcal Vaccine ()    Elevated blood pressure reading without diagnosis of hypertension  Comments:  repeat blood pressure was still elevated 150/107.  Advised patient to call us with blood pressure readings she may need to be started on  "blood pressure medS      Discussed side effects of Prozac, advised doing an EKG to check QT, patient declined today.  Discussed treatment of osteopenia including calcium and vitamin D supplements, we will check vitamin D level ;intake vitamin D3 at least 1000 units a day, calcium in the form of Caltrate 1 to 2 tablets a day.  We discussed getting the Pneumovax and the Shingrix vaccine, she will postpone the shingles vaccine for now.  She had received her flu vaccine.  Counseled on lifestyle changes, exercise activity , her blood pressure was elevated today and repeat was elevated at 150/107.  Because of hyperlipidemia advised her she will need to be on statin.  Her 10-year atherosclerotic cardiovascular disease risk ordered after we get the lipid panel results.  Advised her she may need an echo as well to rule out hypertensive heart disease.  It is concerning that the elevated blood pressure and hyperlipidemia are major risk factor for cardiovascular disease.  Possibly elevated  blood pressure due to whitecoat syndrome, continue to closely monitor as outpatient.    Patient has been advised of split billing requirements and indicates understanding: Yes  COUNSELING:  Reviewed preventive health counseling, as reflected in patient instructions       Regular exercise       Healthy diet/nutrition       Vision screening       Hearing screening       Dental care       Bladder control       Fall risk prevention       Immunizations    Vaccinated for: Pneumococcal             Alcohol Use       Colon cancer screening       Advanced Planning     Estimated body mass index is 32.56 kg/m  as calculated from the following:    Height as of this encounter: 1.69 m (5' 6.54\").    Weight as of this encounter: 93 kg (205 lb).    Weight management plan: Discussed healthy diet and exercise guidelines    She reports that she quit smoking about 41 years ago. Her smoking use included cigarettes. She started smoking about 43 years ago. She has " a 2.50 pack-year smoking history. She has never used smokeless tobacco.      Appropriate preventive services were discussed with this patient, including applicable screening as appropriate for cardiovascular disease, diabetes, osteopenia/osteoporosis, and glaucoma.  As appropriate for age/gender, discussed screening for colorectal cancer, prostate cancer, breast cancer, and cervical cancer. Checklist reviewing preventive services available has been given to the patient.    Reviewed patients plan of care and provided an AVS. The Basic Care Plan (routine screening as documented in Health Maintenance) for Marina meets the Care Plan requirement. This Care Plan has been established and reviewed with the Patient.    Counseling Resources:  ATP IV Guidelines  Pooled Cohorts Equation Calculator  Breast Cancer Risk Calculator  Breast Cancer: Medication to Reduce Risk  FRAX Risk Assessment  ICSI Preventive Guidelines  Dietary Guidelines for Americans, 2010  USDA's MyPlate  ASA Prophylaxis  Lung CA Screening    Tran Gayle MD  [facility]    Identified Health Risks:

## 2020-11-04 NOTE — NURSING NOTE
Prior to immunization administration, verified patients identity using patient s name and date of birth. Please see Immunization Activity for additional information.     Screening Questionnaire for Adult Immunization    Are you sick today?   No   Do you have allergies to medications, food, a vaccine component or latex?   No   Have you ever had a serious reaction after receiving a vaccination?   No   Do you have a long-term health problem with heart, lung, kidney, or metabolic disease (e.g., diabetes), asthma, a blood disorder, no spleen, complement component deficiency, a cochlear implant, or a spinal fluid leak?  Are you on long-term aspirin therapy?   No   Do you have cancer, leukemia, HIV/AIDS, or any other immune system problem?   No   Do you have a parent, brother, or sister with an immune system problem?   No   In the past 3 months, have you taken medications that affect  your immune system, such as prednisone, other steroids, or anticancer drugs; drugs for the treatment of rheumatoid arthritis, Crohn s disease, or psoriasis; or have you had radiation treatments?   No   Have you had a seizure, or a brain or other nervous system problem?   No   During the past year, have you received a transfusion of blood or blood    products, or been given immune (gamma) globulin or antiviral drug?   No   For women: Are you pregnant or is there a chance you could become       pregnant during the next month?   No   Have you received any vaccinations in the past 4 weeks?   Yes     Immunization questionnaire was positive for at least one answer.  Notified Dr. Gayle.        Per orders of Dr. Gayle, injection of [id number] given by Melissa Ledesma MA. Patient instructed to remain in clinic for 15 minutes afterwards, and to report any adverse reaction to me immediately.       Screening performed by Melissa Ledesma MA on 11/4/2020 at 11:29 AM.

## 2020-11-05 DIAGNOSIS — E78.5 HYPERLIPIDEMIA LDL GOAL <130: Primary | ICD-10-CM

## 2020-11-05 DIAGNOSIS — R03.0 ELEVATED BLOOD PRESSURE READING: ICD-10-CM

## 2020-11-05 LAB
ALBUMIN SERPL-MCNC: 4.2 G/DL (ref 3.4–5)
ALP SERPL-CCNC: 73 U/L (ref 40–150)
ALT SERPL W P-5'-P-CCNC: 35 U/L (ref 0–50)
ANION GAP SERPL CALCULATED.3IONS-SCNC: 2 MMOL/L (ref 3–14)
AST SERPL W P-5'-P-CCNC: 22 U/L (ref 0–45)
BILIRUB SERPL-MCNC: 0.4 MG/DL (ref 0.2–1.3)
BUN SERPL-MCNC: 8 MG/DL (ref 7–30)
CALCIUM SERPL-MCNC: 8.9 MG/DL (ref 8.5–10.1)
CHLORIDE SERPL-SCNC: 107 MMOL/L (ref 94–109)
CHOLEST SERPL-MCNC: 260 MG/DL
CO2 SERPL-SCNC: 29 MMOL/L (ref 20–32)
CREAT SERPL-MCNC: 0.66 MG/DL (ref 0.52–1.04)
DEPRECATED CALCIDIOL+CALCIFEROL SERPL-MC: 34 UG/L (ref 20–75)
GFR SERPL CREATININE-BSD FRML MDRD: >90 ML/MIN/{1.73_M2}
GLUCOSE SERPL-MCNC: 83 MG/DL (ref 70–99)
HDLC SERPL-MCNC: 51 MG/DL
LDLC SERPL CALC-MCNC: 155 MG/DL
NONHDLC SERPL-MCNC: 209 MG/DL
POTASSIUM SERPL-SCNC: 4.1 MMOL/L (ref 3.4–5.3)
PROT SERPL-MCNC: 7.7 G/DL (ref 6.8–8.8)
SODIUM SERPL-SCNC: 138 MMOL/L (ref 133–144)
TRIGL SERPL-MCNC: 269 MG/DL
TSH SERPL DL<=0.005 MIU/L-ACNC: 3.54 MU/L (ref 0.4–4)

## 2020-11-05 RX ORDER — ROSUVASTATIN CALCIUM 20 MG/1
20 TABLET, COATED ORAL DAILY
Qty: 90 TABLET | Refills: 0 | Status: SHIPPED | OUTPATIENT
Start: 2020-11-05 | End: 2021-01-28

## 2020-11-05 RX ORDER — LOSARTAN POTASSIUM 25 MG/1
25 TABLET ORAL DAILY
Qty: 90 TABLET | Refills: 0 | Status: SHIPPED | OUTPATIENT
Start: 2020-11-05 | End: 2021-01-31

## 2020-11-05 ASSESSMENT — ANXIETY QUESTIONNAIRES: GAD7 TOTAL SCORE: 3

## 2020-11-05 NOTE — RESULT ENCOUNTER NOTE
Nic Gray,  Reviewed your labs  Thyroid test called TSH is normal,  Cholesterol panel shows elevated LDL [bad cholesterol] at 155 but decreased from 191, with an HDL good cholesterol of 51 which is at goal, elevated triglyceride at 269, please continue with lifestyle changes, calculated your 10-year atherosclerotic cardiovascular disease risk score at 17.74% which is elevated [greater than 10%], according to the guidelines for American Heart Association to start on cholesterol medication statin I would recommend you start on rosuvastatin Crestor 20 mg 1 tablet once daily take at bedtime.  In addition to lifestyle changes; low-fat low-cholesterol diet and increase exercise activities as tolerated, brisk walking 30 minutes 5 times a week and weight loss as tolerated.  Chemistry shows normal electrolytes, normal kidney function test, normal calcium level, normal total protein albumin, normal liver enzymes called ALT and AST.,  Vitamin D level is at 34 which is within normal but on the low side, I still recommend you take vitamin D3 2000 units once daily,  Your CBC shows normal white cell count, normal hemoglobin hematocrit there is no anemia, normal platelet count which is reassuring.  Any further questions please let us know  I will send prescription to the pharmacy on record.  Please you need to repeat the cholesterol fasting in 3 months with liver enzymes.  Please call to schedule labs in 3 months, as discussed in clinic.  Please call us with blood pressure readings, as you are aware your repeat of blood pressure taken in the clinic was 150/107 which is elevated and I would recommend you start on a blood pressure medicine called losartan 25 mg 1 tablet once daily [low-dose] we will adjust dose according to blood pressure readings.  Dr. Gayle

## 2020-11-16 ASSESSMENT — PATIENT HEALTH QUESTIONNAIRE - PHQ9: SUM OF ALL RESPONSES TO PHQ QUESTIONS 1-9: 5

## 2021-01-27 DIAGNOSIS — E78.5 HYPERLIPIDEMIA LDL GOAL <130: ICD-10-CM

## 2021-01-28 RX ORDER — ROSUVASTATIN CALCIUM 20 MG/1
20 TABLET, COATED ORAL DAILY
Qty: 90 TABLET | Refills: 0 | Status: SHIPPED | OUTPATIENT
Start: 2021-01-28 | End: 2021-03-10

## 2021-01-29 DIAGNOSIS — R03.0 ELEVATED BLOOD PRESSURE READING: ICD-10-CM

## 2021-01-31 RX ORDER — LOSARTAN POTASSIUM 25 MG/1
25 TABLET ORAL DAILY
Qty: 90 TABLET | Refills: 0 | Status: SHIPPED | OUTPATIENT
Start: 2021-01-31 | End: 2021-02-22

## 2021-01-31 NOTE — TELEPHONE ENCOUNTER
Failed protocol.  please route to  team if patient needs an appointment     Soha DORADORN BSN  [facility]  [phone]

## 2021-02-22 ENCOUNTER — OFFICE VISIT (OUTPATIENT)
Dept: FAMILY MEDICINE | Facility: CLINIC | Age: 66
End: 2021-02-22
Payer: COMMERCIAL

## 2021-02-22 VITALS
TEMPERATURE: 95.5 F | HEART RATE: 92 BPM | SYSTOLIC BLOOD PRESSURE: 139 MMHG | DIASTOLIC BLOOD PRESSURE: 90 MMHG | BODY MASS INDEX: 32.83 KG/M2 | OXYGEN SATURATION: 92 % | WEIGHT: 206.7 LBS

## 2021-02-22 DIAGNOSIS — F33.40 RECURRENT MAJOR DEPRESSION IN REMISSION (H): Chronic | ICD-10-CM

## 2021-02-22 DIAGNOSIS — E78.2 MIXED HYPERLIPIDEMIA: ICD-10-CM

## 2021-02-22 DIAGNOSIS — J01.90 ACUTE SINUSITIS, RECURRENCE NOT SPECIFIED, UNSPECIFIED LOCATION: ICD-10-CM

## 2021-02-22 DIAGNOSIS — I10 ESSENTIAL HYPERTENSION: Primary | ICD-10-CM

## 2021-02-22 PROCEDURE — 99214 OFFICE O/P EST MOD 30 MIN: CPT | Performed by: INTERNAL MEDICINE

## 2021-02-22 RX ORDER — LOSARTAN POTASSIUM 50 MG/1
50 TABLET ORAL DAILY
Qty: 90 TABLET | Refills: 3 | Status: SHIPPED | OUTPATIENT
Start: 2021-02-22 | End: 2021-06-25

## 2021-02-22 RX ORDER — AZITHROMYCIN 250 MG/1
TABLET, FILM COATED ORAL
Qty: 6 TABLET | Refills: 0 | Status: SHIPPED | OUTPATIENT
Start: 2021-02-22 | End: 2021-02-27

## 2021-02-22 NOTE — PROGRESS NOTES
Assessment & Plan     Essential hypertension  Borderline elevated.   Increase to 50mg daily  F/U in 2-3 weeks and plan for repeat BMP then.  Home monitoring encouraged  - losartan (COZAAR) 50 MG tablet  Dispense: 90 tablet; Refill: 3    Mixed hyperlipidemia  Continue statin  Check fasting labs next visit    Recurrent major depression in remission (H)  Controlled on prozac    Sinusitis: rx zpak. Continue flonase and antihistamine    Return in about 3 weeks (around 3/15/2021) for Follow up, with me, in person, sooner if symptoms worsen or do not improve.    DO JUJU Leone [facility] YAW Serrano is a 65 year old who presents for the following health issues     HPI     Former PCP: Irwin  Problem list and medications reviewed and updated. See below for additional notes.    Pnt here to establish care.    She was started on losartan and statin in the last few months. Tolerating both well. No SE. Denies lightheadedness or dizziness. Does not check BP often at home.    Reports possible sinus infection. Gets them on occasion and zpak works well. This episode about 1 week left sided sinus pressure/discomfort above eye and to left maxillary sinus. No f/c. Taking flonase and antihistamine.      Review of Systems   Constitutional, HEENT, cardiovascular, pulmonary, gi and gu systems are negative, except as otherwise noted.      Objective    BP (!) 139/90   Pulse 92   Temp 95.5  F (35.3  C) (Temporal)   Wt 93.8 kg (206 lb 11.2 oz)   SpO2 92%   Breastfeeding No   BMI 32.83 kg/m    Body mass index is 32.83 kg/m .  Physical Exam     GENERAL APPEARANCE: AAOx3, no distress. Well developed.    PSYCH: appropriate mood and affect.

## 2021-03-10 ENCOUNTER — OFFICE VISIT (OUTPATIENT)
Dept: FAMILY MEDICINE | Facility: CLINIC | Age: 66
End: 2021-03-10
Payer: COMMERCIAL

## 2021-03-10 VITALS
SYSTOLIC BLOOD PRESSURE: 134 MMHG | OXYGEN SATURATION: 95 % | TEMPERATURE: 97.7 F | BODY MASS INDEX: 32.33 KG/M2 | HEART RATE: 75 BPM | WEIGHT: 206 LBS | DIASTOLIC BLOOD PRESSURE: 79 MMHG | HEIGHT: 67 IN

## 2021-03-10 DIAGNOSIS — I10 ESSENTIAL HYPERTENSION: Primary | ICD-10-CM

## 2021-03-10 DIAGNOSIS — E78.5 HYPERLIPIDEMIA LDL GOAL <130: ICD-10-CM

## 2021-03-10 PROCEDURE — 99213 OFFICE O/P EST LOW 20 MIN: CPT | Performed by: INTERNAL MEDICINE

## 2021-03-10 PROCEDURE — 80061 LIPID PANEL: CPT | Performed by: INTERNAL MEDICINE

## 2021-03-10 PROCEDURE — 80053 COMPREHEN METABOLIC PANEL: CPT | Performed by: INTERNAL MEDICINE

## 2021-03-10 PROCEDURE — 36415 COLL VENOUS BLD VENIPUNCTURE: CPT | Performed by: INTERNAL MEDICINE

## 2021-03-10 RX ORDER — ROSUVASTATIN CALCIUM 20 MG/1
20 TABLET, COATED ORAL AT BEDTIME
Qty: 90 TABLET | Refills: 3 | Status: SHIPPED | OUTPATIENT
Start: 2021-03-10 | End: 2022-05-17

## 2021-03-10 ASSESSMENT — MIFFLIN-ST. JEOR: SCORE: 1499.65

## 2021-03-11 LAB
ALBUMIN SERPL-MCNC: 4 G/DL (ref 3.4–5)
ALP SERPL-CCNC: 81 U/L (ref 40–150)
ALT SERPL W P-5'-P-CCNC: 30 U/L (ref 0–50)
ANION GAP SERPL CALCULATED.3IONS-SCNC: 3 MMOL/L (ref 3–14)
AST SERPL W P-5'-P-CCNC: 17 U/L (ref 0–45)
BILIRUB SERPL-MCNC: 0.4 MG/DL (ref 0.2–1.3)
BUN SERPL-MCNC: 13 MG/DL (ref 7–30)
CALCIUM SERPL-MCNC: 8.9 MG/DL (ref 8.5–10.1)
CHLORIDE SERPL-SCNC: 110 MMOL/L (ref 94–109)
CHOLEST SERPL-MCNC: 143 MG/DL
CO2 SERPL-SCNC: 27 MMOL/L (ref 20–32)
CREAT SERPL-MCNC: 0.71 MG/DL (ref 0.52–1.04)
GFR SERPL CREATININE-BSD FRML MDRD: 88 ML/MIN/{1.73_M2}
GLUCOSE SERPL-MCNC: 95 MG/DL (ref 70–99)
HDLC SERPL-MCNC: 58 MG/DL
LDLC SERPL CALC-MCNC: 62 MG/DL
NONHDLC SERPL-MCNC: 85 MG/DL
POTASSIUM SERPL-SCNC: 4.7 MMOL/L (ref 3.4–5.3)
PROT SERPL-MCNC: 7.6 G/DL (ref 6.8–8.8)
SODIUM SERPL-SCNC: 140 MMOL/L (ref 133–144)
TRIGL SERPL-MCNC: 113 MG/DL

## 2021-10-03 ENCOUNTER — HEALTH MAINTENANCE LETTER (OUTPATIENT)
Age: 66
End: 2021-10-03

## 2021-11-05 ASSESSMENT — ENCOUNTER SYMPTOMS
NAUSEA: 0
EYE PAIN: 0
CHILLS: 0
BREAST MASS: 0
SORE THROAT: 0
HEMATOCHEZIA: 0
PALPITATIONS: 0
JOINT SWELLING: 0
ARTHRALGIAS: 0
DIZZINESS: 0
DYSURIA: 0
PARESTHESIAS: 0
HEMATURIA: 0
CONSTIPATION: 0
WEAKNESS: 0
FEVER: 1
HEARTBURN: 0
COUGH: 0
FREQUENCY: 0
HEADACHES: 0
MYALGIAS: 0
DIARRHEA: 0
ABDOMINAL PAIN: 0
SHORTNESS OF BREATH: 0
NERVOUS/ANXIOUS: 0

## 2021-11-05 ASSESSMENT — ACTIVITIES OF DAILY LIVING (ADL): CURRENT_FUNCTION: NO ASSISTANCE NEEDED

## 2021-11-08 ENCOUNTER — OFFICE VISIT (OUTPATIENT)
Dept: FAMILY MEDICINE | Facility: CLINIC | Age: 66
End: 2021-11-08
Payer: COMMERCIAL

## 2021-11-08 VITALS
TEMPERATURE: 97.5 F | HEIGHT: 65 IN | BODY MASS INDEX: 29.66 KG/M2 | DIASTOLIC BLOOD PRESSURE: 105 MMHG | HEART RATE: 73 BPM | SYSTOLIC BLOOD PRESSURE: 147 MMHG | RESPIRATION RATE: 14 BRPM | OXYGEN SATURATION: 98 % | WEIGHT: 178 LBS

## 2021-11-08 DIAGNOSIS — E78.2 MIXED HYPERLIPIDEMIA: Chronic | ICD-10-CM

## 2021-11-08 DIAGNOSIS — Z00.00 ROUTINE HISTORY AND PHYSICAL EXAMINATION OF ADULT: Primary | ICD-10-CM

## 2021-11-08 DIAGNOSIS — I10 ESSENTIAL HYPERTENSION: Chronic | ICD-10-CM

## 2021-11-08 DIAGNOSIS — Z23 NEED FOR VACCINATION: ICD-10-CM

## 2021-11-08 DIAGNOSIS — Z11.59 NEED FOR HEPATITIS C SCREENING TEST: ICD-10-CM

## 2021-11-08 DIAGNOSIS — F33.40 RECURRENT MAJOR DEPRESSION IN REMISSION (H): Chronic | ICD-10-CM

## 2021-11-08 LAB
CHOLEST SERPL-MCNC: 128 MG/DL
FASTING STATUS PATIENT QL REPORTED: YES
HCV AB SERPL QL IA: NONREACTIVE
HDLC SERPL-MCNC: 53 MG/DL
LDLC SERPL CALC-MCNC: 53 MG/DL
NONHDLC SERPL-MCNC: 75 MG/DL
TRIGL SERPL-MCNC: 108 MG/DL

## 2021-11-08 PROCEDURE — 80061 LIPID PANEL: CPT | Performed by: INTERNAL MEDICINE

## 2021-11-08 PROCEDURE — G0009 ADMIN PNEUMOCOCCAL VACCINE: HCPCS | Performed by: INTERNAL MEDICINE

## 2021-11-08 PROCEDURE — 36415 COLL VENOUS BLD VENIPUNCTURE: CPT | Performed by: INTERNAL MEDICINE

## 2021-11-08 PROCEDURE — 90670 PCV13 VACCINE IM: CPT | Performed by: INTERNAL MEDICINE

## 2021-11-08 PROCEDURE — 86803 HEPATITIS C AB TEST: CPT | Performed by: INTERNAL MEDICINE

## 2021-11-08 PROCEDURE — G0438 PPPS, INITIAL VISIT: HCPCS | Performed by: INTERNAL MEDICINE

## 2021-11-08 ASSESSMENT — ACTIVITIES OF DAILY LIVING (ADL): CURRENT_FUNCTION: NO ASSISTANCE NEEDED

## 2021-11-08 ASSESSMENT — ENCOUNTER SYMPTOMS
SHORTNESS OF BREATH: 0
DIARRHEA: 0
NERVOUS/ANXIOUS: 0
SORE THROAT: 0
COUGH: 0
FREQUENCY: 0
HEMATURIA: 0
WEAKNESS: 0
PALPITATIONS: 0
MYALGIAS: 0
EYE PAIN: 0
ARTHRALGIAS: 0
DIZZINESS: 0
ABDOMINAL PAIN: 0
BREAST MASS: 0
CONSTIPATION: 0
CHILLS: 0
DYSURIA: 0
HEMATOCHEZIA: 0
PARESTHESIAS: 0
NAUSEA: 0
HEARTBURN: 0
HEADACHES: 0
FEVER: 1
JOINT SWELLING: 0

## 2021-11-08 ASSESSMENT — MIFFLIN-ST. JEOR: SCORE: 1347.02

## 2021-11-08 NOTE — PROGRESS NOTES
"SUBJECTIVE:   Marina Escudero is a 66 year old female who presents for Preventive Visit.      Patient has been advised of split billing requirements and indicates understanding: Yes   Are you in the first 12 months of your Medicare coverage?  No    Healthy Habits:     In general, how would you rate your overall health?  Excellent    Frequency of exercise:  2-3 days/week    Duration of exercise:  15-30 minutes    Do you usually eat at least 4 servings of fruit and vegetables a day, include whole grains    & fiber and avoid regularly eating high fat or \"junk\" foods?  Yes    Taking medications regularly:  Yes    Medication side effects:  None    Ability to successfully perform activities of daily living:  No assistance needed    Home Safety:  No safety concerns identified    Hearing Impairment:  No hearing concerns    In the past 6 months, have you been bothered by leaking of urine?  No    In general, how would you rate your overall mental or emotional health?  Good      PHQ-2 Total Score: 1    Additional concerns today:  No    Do you feel safe in your environment? Yes    Have you ever done Advance Care Planning? (For example, a Health Directive, POLST, or a discussion with a medical provider or your loved ones about your wishes): Yes, patient states has an Advance Care Planning document and will bring a copy to the clinic.       Fall risk  Fallen 2 or more times in the past year?: No  Any fall with injury in the past year?: No      Do you have sleep apnea, excessive snoring or daytime drowsiness?: no    Reviewed and updated as needed this visit by clinical staff  Tobacco  Allergies  Meds             Reviewed and updated as needed this visit by Provider               Social History     Tobacco Use     Smoking status: Former Smoker     Packs/day: 0.50     Years: 5.00     Pack years: 2.50     Types: Cigarettes     Start date: 1977     Quit date: 1979     Years since quittin.1     Smokeless tobacco: Never " Used     Tobacco comment: less than 1/2 pack - social only   Substance Use Topics     Alcohol use: Yes     Alcohol/week: 0.0 standard drinks     Comment: 2 drinks per week     If you drink alcohol do you typically have >3 drinks per day or >7 drinks per week? No    Alcohol Use 11/8/2021   Prescreen: >3 drinks/day or >7 drinks/week? -   Prescreen: >3 drinks/day or >7 drinks/week? No       Current providers sharing in care for this patient include:   Patient Care Team:  Yokasta Alvarado DO as PCP - General (Internal Medicine)  Yokasta Alvarado DO as Assigned PCP    The following health maintenance items are reviewed in Epic and correct as of today:  Health Maintenance Due   Topic Date Due     HEPATITIS C SCREENING  Never done     PHQ-9  05/04/2021     COVID-19 Vaccine (3 - Booster for Moderna series) 09/13/2021     MAMMO SCREENING  10/28/2021     ZOSTER IMMUNIZATION (2 of 2) 11/08/2021         Breast CA Risk Assessment (FHS-7) 11/5/2021 11/5/2021   Do you have a family history of breast, colon, or ovarian cancer? Yes No / Unknown       She has lost weight on WW diet and self discontinued losartan. BP's at home are excellent, monitoring closely. Feeling well overall. Interested in rechecking lipids.    Review of Systems   Constitutional: Positive for fever. Negative for chills.   HENT: Negative for congestion, ear pain, hearing loss and sore throat.    Eyes: Negative for pain and visual disturbance.   Respiratory: Negative for cough and shortness of breath.    Cardiovascular: Negative for chest pain, palpitations and peripheral edema.   Gastrointestinal: Negative for abdominal pain, constipation, diarrhea, heartburn, hematochezia and nausea.   Breasts:  Negative for tenderness, breast mass and discharge.   Genitourinary: Negative for dysuria, frequency, genital sores, hematuria, pelvic pain, urgency, vaginal bleeding and vaginal discharge.   Musculoskeletal: Negative for arthralgias, joint swelling and myalgias.   Skin:  "Negative for rash.   Neurological: Negative for dizziness, weakness, headaches and paresthesias.   Psychiatric/Behavioral: Negative for mood changes. The patient is not nervous/anxious.        OBJECTIVE:   BP (!) 147/105 (BP Location: Right arm, Cuff Size: Adult Regular)   Pulse 73   Temp 97.5  F (36.4  C) (Temporal)   Resp 14   Ht 1.657 m (5' 5.24\")   Wt 80.7 kg (178 lb)   SpO2 98%   Breastfeeding No   BMI 29.41 kg/m   Estimated body mass index is 29.41 kg/m  as calculated from the following:    Height as of this encounter: 1.657 m (5' 5.24\").    Weight as of this encounter: 80.7 kg (178 lb).  Physical Exam    GENERAL APPEARANCE: AAOx3, no distress. Well developed.     NECK: Supple without adenopathy, Trachea is midline. No masses palpated.    RESP: Lungs CTA bilaterally. No w/r/r. No distress     CV: RRR, S1/S2 present. No m/r/c.     ABDOMEN:  soft, nontender, no distention. No rebound or guarding.     EXT: No c/c/e in lower extremities b/l. No rashes or deformities noted.    MSK: ROM and strength intact in four extremities. No gross deformities noted.    SKIN: no suspicious lesions or rashes    NEURO: AAOx3, Motor function intact w/ 5/5 strength bilaterally to UE and LE. Speech is fluent and comprehension intact. No gait abnormalities noted.    PSYCH: appropriate mood and affect.     BREAST: bilateral exam without masses, tenderness or nipple discharge; no palpable axillary masses or adenopathy      ASSESSMENT / PLAN:   Marina was seen today for physical.    Diagnoses and all orders for this visit:    Routine history and physical examination of adult   Preventative measures UTD   She is due for moderna booster-will plan to do this at pharmacy   She is due for prevnar 13-will do this today     Essential hypertension   Elevated today off losartan. Continue home monitoring (reports well controlled at home and asymptomatic)    Mixed hyperlipidemia  Repeat given weight loss, continues statin  -     Lipid panel " "reflex to direct LDL Fasting; Future    Recurrent major depression in remission (H)   Controlled    Need for hepatitis C screening test  -     Hepatitis C antibody; Future    Other orders  -     REVIEW OF HEALTH MAINTENANCE PROTOCOL ORDERS      COUNSELING:  Reviewed preventive health counseling, as reflected in patient instructions       Regular exercise    Estimated body mass index is 29.41 kg/m  as calculated from the following:    Height as of this encounter: 1.657 m (5' 5.24\").    Weight as of this encounter: 80.7 kg (178 lb).        She reports that she quit smoking about 42 years ago. Her smoking use included cigarettes. She started smoking about 44 years ago. She has a 2.50 pack-year smoking history. She has never used smokeless tobacco.        Counseling Resources:  ATP IV Guidelines  Pooled Cohorts Equation Calculator  Breast Cancer Risk Calculator  Breast Cancer: Medication to Reduce Risk  FRAX Risk Assessment  ICSI Preventive Guidelines  Dietary Guidelines for Americans, 2010  USDA's MyPlate  ASA Prophylaxis  Lung CA Screening    DO JUJU Leone [facility]      "

## 2021-11-09 ASSESSMENT — PATIENT HEALTH QUESTIONNAIRE - PHQ9: SUM OF ALL RESPONSES TO PHQ QUESTIONS 1-9: 2

## 2021-12-08 ENCOUNTER — ANCILLARY PROCEDURE (OUTPATIENT)
Dept: MAMMOGRAPHY | Facility: CLINIC | Age: 66
End: 2021-12-08
Payer: COMMERCIAL

## 2021-12-08 DIAGNOSIS — Z12.31 VISIT FOR SCREENING MAMMOGRAM: ICD-10-CM

## 2021-12-08 PROCEDURE — 77063 BREAST TOMOSYNTHESIS BI: CPT | Mod: TC | Performed by: RADIOLOGY

## 2021-12-08 PROCEDURE — 77067 SCR MAMMO BI INCL CAD: CPT | Mod: TC | Performed by: RADIOLOGY

## 2022-04-25 ENCOUNTER — IMMUNIZATION (OUTPATIENT)
Dept: NURSING | Facility: CLINIC | Age: 67
End: 2022-04-25
Payer: COMMERCIAL

## 2022-04-25 PROCEDURE — 0064A COVID-19,PF,MODERNA (18+ YRS BOOSTER .25ML): CPT

## 2022-04-25 PROCEDURE — 91306 COVID-19,PF,MODERNA (18+ YRS BOOSTER .25ML): CPT

## 2022-05-17 DIAGNOSIS — F43.23 ADJUSTMENT DISORDER WITH MIXED ANXIETY AND DEPRESSED MOOD: ICD-10-CM

## 2022-05-17 DIAGNOSIS — E78.5 HYPERLIPIDEMIA LDL GOAL <130: ICD-10-CM

## 2022-05-17 NOTE — TELEPHONE ENCOUNTER
LOV 11-8-2021 Christiano, follow up 1 year  No future OV scheduled    Break in therapy, fluoxetine last filled #90 7-  Break in therapy, rosuvastatin last filled #90 12-    Kassy Lr RT (R)

## 2022-05-18 ENCOUNTER — MYC MEDICAL ADVICE (OUTPATIENT)
Dept: FAMILY MEDICINE | Facility: CLINIC | Age: 67
End: 2022-05-18
Payer: COMMERCIAL

## 2022-05-18 NOTE — TELEPHONE ENCOUNTER
Patient sent MyChart 5/18/2022 - has one week of medications left.    Reply sent back - requests were received from [facility] 5/17.  Physical due November.    RT Chris (R)

## 2022-05-19 NOTE — TELEPHONE ENCOUNTER
Routing refill request to provider for review/approval because:  A break in medication:  Fluoxetine last filled #90 07/25/2021  Rosuvastatin #90 12/16/2021    Pt's last OV was 11/08/2021, fluoxetine requires visit within 6 months.  Next OV scheduled for 11/11/2022.    Rosuvastatin no FMG protocol    Melba Suarez RN

## 2022-05-20 RX ORDER — ROSUVASTATIN CALCIUM 20 MG/1
20 TABLET, COATED ORAL AT BEDTIME
Qty: 90 TABLET | Refills: 1 | Status: SHIPPED | OUTPATIENT
Start: 2022-05-20 | End: 2022-11-11

## 2022-05-24 ENCOUNTER — MYC MEDICAL ADVICE (OUTPATIENT)
Dept: FAMILY MEDICINE | Facility: CLINIC | Age: 67
End: 2022-05-24
Payer: COMMERCIAL

## 2022-09-07 NOTE — PROGRESS NOTES
"    Assessment & Plan     Essential hypertension  Controlled with recent adjustment  - Comprehensive metabolic panel  - Lipid panel reflex to direct LDL Fasting    Hyperlipidemia LDL goal <130  Check labs on statin and LFT.  Refilled  - rosuvastatin (CRESTOR) 20 MG tablet  Dispense: 90 tablet; Refill: 3  - Comprehensive metabolic panel  - Lipid panel reflex to direct LDL Fasting      Return in about 8 months (around 11/10/2021) for Routine preventive, with me, in person, sooner if symptoms worsen or do not improve.    Yokasta Alvarado DO  [facility] YAW Serrano is a 65 year old who presents for the following health issues     HPI       Medication Followup of Losartan    Taking Medication as prescribed: yes    Side Effects:  Some lightheadedness the first week, but has since resolved.    Medication Helping Symptoms:  yes     BP better at home. Feeling well. No adverse effects.  Gets second COVID vaccine soon.  Fasting today    Review of Systems   Constitutional, HEENT, cardiovascular, pulmonary, gi and gu systems are negative, except as otherwise noted.      Objective    /79 (BP Location: Right arm, Cuff Size: Adult Large)   Pulse 75   Temp 97.7  F (36.5  C) (Temporal)   Ht 1.69 m (5' 6.54\")   Wt 93.4 kg (206 lb)   SpO2 95%   Breastfeeding No   BMI 32.72 kg/m    Body mass index is 32.72 kg/m .  Physical Exam     GENERAL APPEARANCE: AAOx3, no distress. Well developed.    PSYCH: appropriate mood and affect.               " Dilution (U/ 0.1cc): 10

## 2022-09-10 ENCOUNTER — HEALTH MAINTENANCE LETTER (OUTPATIENT)
Age: 67
End: 2022-09-10

## 2022-09-21 ENCOUNTER — IMMUNIZATION (OUTPATIENT)
Dept: NURSING | Facility: CLINIC | Age: 67
End: 2022-09-21
Payer: COMMERCIAL

## 2022-09-21 PROCEDURE — 91313 COVID-19,PF,MODERNA BIVALENT: CPT

## 2022-09-21 PROCEDURE — 0134A COVID-19,PF,MODERNA BIVALENT: CPT

## 2022-11-09 ASSESSMENT — ENCOUNTER SYMPTOMS
HEADACHES: 0
BREAST MASS: 0
SORE THROAT: 0
DYSURIA: 0
DIZZINESS: 0
DIARRHEA: 0
PARESTHESIAS: 0
FEVER: 0
JOINT SWELLING: 0
MYALGIAS: 0
WEAKNESS: 0
PALPITATIONS: 0
CONSTIPATION: 0
NERVOUS/ANXIOUS: 0
HEMATURIA: 0
CHILLS: 0
HEARTBURN: 0
NAUSEA: 0
SHORTNESS OF BREATH: 0
ARTHRALGIAS: 0
FREQUENCY: 0
ABDOMINAL PAIN: 0
COUGH: 0
EYE PAIN: 0
HEMATOCHEZIA: 0

## 2022-11-09 ASSESSMENT — ACTIVITIES OF DAILY LIVING (ADL): CURRENT_FUNCTION: NO ASSISTANCE NEEDED

## 2022-11-11 ENCOUNTER — OFFICE VISIT (OUTPATIENT)
Dept: FAMILY MEDICINE | Facility: CLINIC | Age: 67
End: 2022-11-11
Payer: COMMERCIAL

## 2022-11-11 VITALS
DIASTOLIC BLOOD PRESSURE: 89 MMHG | SYSTOLIC BLOOD PRESSURE: 130 MMHG | BODY MASS INDEX: 34.54 KG/M2 | OXYGEN SATURATION: 98 % | HEIGHT: 65 IN | WEIGHT: 207.3 LBS | HEART RATE: 77 BPM | RESPIRATION RATE: 16 BRPM

## 2022-11-11 DIAGNOSIS — F33.40 RECURRENT MAJOR DEPRESSION IN REMISSION (H): Chronic | ICD-10-CM

## 2022-11-11 DIAGNOSIS — Z23 NEED FOR VACCINATION: ICD-10-CM

## 2022-11-11 DIAGNOSIS — Z00.00 ROUTINE HISTORY AND PHYSICAL EXAMINATION OF ADULT: Primary | ICD-10-CM

## 2022-11-11 DIAGNOSIS — E78.2 MIXED HYPERLIPIDEMIA: ICD-10-CM

## 2022-11-11 LAB
ALBUMIN SERPL-MCNC: 3.9 G/DL (ref 3.4–5)
ALP SERPL-CCNC: 66 U/L (ref 40–150)
ALT SERPL W P-5'-P-CCNC: 31 U/L (ref 0–50)
ANION GAP SERPL CALCULATED.3IONS-SCNC: 5 MMOL/L (ref 3–14)
AST SERPL W P-5'-P-CCNC: 25 U/L (ref 0–45)
BASOPHILS # BLD AUTO: 0 10E3/UL (ref 0–0.2)
BASOPHILS NFR BLD AUTO: 1 %
BILIRUB SERPL-MCNC: 0.7 MG/DL (ref 0.2–1.3)
BUN SERPL-MCNC: 14 MG/DL (ref 7–30)
CALCIUM SERPL-MCNC: 8.9 MG/DL (ref 8.5–10.1)
CHLORIDE BLD-SCNC: 107 MMOL/L (ref 94–109)
CHOLEST SERPL-MCNC: 169 MG/DL
CO2 SERPL-SCNC: 27 MMOL/L (ref 20–32)
CREAT SERPL-MCNC: 0.66 MG/DL (ref 0.52–1.04)
EOSINOPHIL # BLD AUTO: 0.2 10E3/UL (ref 0–0.7)
EOSINOPHIL NFR BLD AUTO: 3 %
ERYTHROCYTE [DISTWIDTH] IN BLOOD BY AUTOMATED COUNT: 13.1 % (ref 10–15)
FASTING STATUS PATIENT QL REPORTED: YES
GFR SERPL CREATININE-BSD FRML MDRD: >90 ML/MIN/1.73M2
GLUCOSE BLD-MCNC: 93 MG/DL (ref 70–99)
HCT VFR BLD AUTO: 38.1 % (ref 35–47)
HDLC SERPL-MCNC: 55 MG/DL
HGB BLD-MCNC: 12.3 G/DL (ref 11.7–15.7)
IMM GRANULOCYTES # BLD: 0 10E3/UL
IMM GRANULOCYTES NFR BLD: 0 %
LDLC SERPL CALC-MCNC: 67 MG/DL
LYMPHOCYTES # BLD AUTO: 2.3 10E3/UL (ref 0.8–5.3)
LYMPHOCYTES NFR BLD AUTO: 36 %
MCH RBC QN AUTO: 28.9 PG (ref 26.5–33)
MCHC RBC AUTO-ENTMCNC: 32.3 G/DL (ref 31.5–36.5)
MCV RBC AUTO: 89 FL (ref 78–100)
MONOCYTES # BLD AUTO: 0.5 10E3/UL (ref 0–1.3)
MONOCYTES NFR BLD AUTO: 8 %
NEUTROPHILS # BLD AUTO: 3.4 10E3/UL (ref 1.6–8.3)
NEUTROPHILS NFR BLD AUTO: 52 %
NONHDLC SERPL-MCNC: 114 MG/DL
PLATELET # BLD AUTO: 276 10E3/UL (ref 150–450)
POTASSIUM BLD-SCNC: 4.1 MMOL/L (ref 3.4–5.3)
PROT SERPL-MCNC: 7.3 G/DL (ref 6.8–8.8)
RBC # BLD AUTO: 4.26 10E6/UL (ref 3.8–5.2)
SODIUM SERPL-SCNC: 139 MMOL/L (ref 133–144)
TRIGL SERPL-MCNC: 237 MG/DL
WBC # BLD AUTO: 6.5 10E3/UL (ref 4–11)

## 2022-11-11 PROCEDURE — G0439 PPPS, SUBSEQ VISIT: HCPCS | Performed by: INTERNAL MEDICINE

## 2022-11-11 PROCEDURE — 80053 COMPREHEN METABOLIC PANEL: CPT | Performed by: INTERNAL MEDICINE

## 2022-11-11 PROCEDURE — 36415 COLL VENOUS BLD VENIPUNCTURE: CPT | Performed by: INTERNAL MEDICINE

## 2022-11-11 PROCEDURE — 90471 IMMUNIZATION ADMIN: CPT | Performed by: INTERNAL MEDICINE

## 2022-11-11 PROCEDURE — 90715 TDAP VACCINE 7 YRS/> IM: CPT | Performed by: INTERNAL MEDICINE

## 2022-11-11 PROCEDURE — 85025 COMPLETE CBC W/AUTO DIFF WBC: CPT | Performed by: INTERNAL MEDICINE

## 2022-11-11 PROCEDURE — 80061 LIPID PANEL: CPT | Performed by: INTERNAL MEDICINE

## 2022-11-11 ASSESSMENT — ENCOUNTER SYMPTOMS
NERVOUS/ANXIOUS: 0
CONSTIPATION: 0
NAUSEA: 0
ABDOMINAL PAIN: 0
DIZZINESS: 0
PARESTHESIAS: 0
MYALGIAS: 0
SHORTNESS OF BREATH: 0
FEVER: 0
WEAKNESS: 0
COUGH: 0
ARTHRALGIAS: 0
HEARTBURN: 0
DIARRHEA: 0
CHILLS: 0
HEADACHES: 0
EYE PAIN: 0
HEMATOCHEZIA: 0
JOINT SWELLING: 0
DYSURIA: 0
BREAST MASS: 0
PALPITATIONS: 0
FREQUENCY: 0
HEMATURIA: 0
SORE THROAT: 0

## 2022-11-11 ASSESSMENT — PATIENT HEALTH QUESTIONNAIRE - PHQ9
SUM OF ALL RESPONSES TO PHQ QUESTIONS 1-9: 5
10. IF YOU CHECKED OFF ANY PROBLEMS, HOW DIFFICULT HAVE THESE PROBLEMS MADE IT FOR YOU TO DO YOUR WORK, TAKE CARE OF THINGS AT HOME, OR GET ALONG WITH OTHER PEOPLE: NOT DIFFICULT AT ALL
SUM OF ALL RESPONSES TO PHQ QUESTIONS 1-9: 5

## 2022-11-11 ASSESSMENT — ACTIVITIES OF DAILY LIVING (ADL): CURRENT_FUNCTION: NO ASSISTANCE NEEDED

## 2022-11-11 NOTE — PROGRESS NOTES
"SUBJECTIVE:   Zach is a 67 year old who presents for Preventive Visit.      Patient has been advised of split billing requirements and indicates understanding: Yes  Are you in the first 12 months of your Medicare coverage?  No    Healthy Habits:     In general, how would you rate your overall health?  Excellent    Frequency of exercise:  4-5 days/week    Duration of exercise:  15-30 minutes    Do you usually eat at least 4 servings of fruit and vegetables a day, include whole grains    & fiber and avoid regularly eating high fat or \"junk\" foods?  No    Taking medications regularly:  Yes    Medication side effects:  None    Ability to successfully perform activities of daily living:  No assistance needed    Home Safety:  No safety concerns identified    Hearing Impairment:  No hearing concerns    In the past 6 months, have you been bothered by leaking of urine?  No    In general, how would you rate your overall mental or emotional health?  Good      PHQ-2 Total Score: 1    Additional concerns today:  No    Do you feel safe in your environment? Yes    Have you ever done Advance Care Planning? (For example, a Health Directive, POLST, or a discussion with a medical provider or your loved ones about your wishes): No, advance care planning information given to patient to review.  Patient declined advance care planning discussion at this time.       Fall risk  Fallen 2 or more times in the past year?: No  Any fall with injury in the past year?: No    Do you have sleep apnea, excessive snoring or daytime drowsiness?: no    Reviewed and updated as needed this visit by clinical staff   Tobacco  Allergies  Meds   Med Hx  Surg Hx  Fam Hx  Soc Hx        Reviewed and updated as needed this visit by Provider                 Social History     Tobacco Use     Smoking status: Former     Packs/day: 0.50     Years: 5.00     Pack years: 2.50     Types: Cigarettes     Start date: 9/19/1977     Quit date: 9/23/1979     Years since " quittin.1     Smokeless tobacco: Never     Tobacco comments:     less than 1/2 pack - social only   Substance Use Topics     Alcohol use: Yes     Comment: 2 drinks per week     If you drink alcohol do you typically have >3 drinks per day or >7 drinks per week? No    Alcohol Use 2022   Prescreen: >3 drinks/day or >7 drinks/week? -   Prescreen: >3 drinks/day or >7 drinks/week? No               Current providers sharing in care for this patient include:   Patient Care Team:  Yokasta Alvarado DO as PCP - General (Internal Medicine)  Yokasta Alvarado DO as Assigned PCP    The following health maintenance items are reviewed in Epic and correct as of today:  Health Maintenance   Topic Date Due     DTAP/TDAP/TD IMMUNIZATION (2 - Td or Tdap) 07/10/2022     ANNUAL REVIEW OF HM ORDERS  2022     MEDICARE ANNUAL WELLNESS VISIT  2022     MAMMO SCREENING  2022     PHQ-9  2023     FALL RISK ASSESSMENT  2023     COLORECTAL CANCER SCREENING  2026     LIPID  2026     ADVANCE CARE PLANNING  2027     DEXA  12/10/2034     HEPATITIS C SCREENING  Completed     DEPRESSION ACTION PLAN  Completed     INFLUENZA VACCINE  Completed     Pneumococcal Vaccine: 65+ Years  Completed     ZOSTER IMMUNIZATION  Completed     COVID-19 Vaccine  Completed     IPV IMMUNIZATION  Aged Out     MENINGITIS IMMUNIZATION  Aged Out         Breast CA Risk Assessment (FHS-7) 2021   Do you have a family history of breast, colon, or ovarian cancer? Yes No / Unknown       Review of Systems   Constitutional: Negative for chills and fever.   HENT: Positive for congestion. Negative for ear pain, hearing loss and sore throat.    Eyes: Negative for pain and visual disturbance.   Respiratory: Negative for cough and shortness of breath.    Cardiovascular: Negative for chest pain, palpitations and peripheral edema.   Gastrointestinal: Negative for abdominal pain, constipation, diarrhea, heartburn, hematochezia  "and nausea.   Breasts:  Negative for tenderness, breast mass and discharge.   Genitourinary: Negative for dysuria, frequency, genital sores, hematuria, pelvic pain, urgency, vaginal bleeding and vaginal discharge.   Musculoskeletal: Negative for arthralgias, joint swelling and myalgias.   Skin: Negative for rash.   Neurological: Negative for dizziness, weakness, headaches and paresthesias.   Psychiatric/Behavioral: Negative for mood changes. The patient is not nervous/anxious.          OBJECTIVE:   /89 (BP Location: Left arm, Patient Position: Sitting, Cuff Size: Adult Regular)   Pulse 77   Resp 16   Ht 1.657 m (5' 5.24\")   Wt 94 kg (207 lb 4.8 oz)   SpO2 98%   BMI 34.24 kg/m   Estimated body mass index is 34.24 kg/m  as calculated from the following:    Height as of this encounter: 1.657 m (5' 5.24\").    Weight as of this encounter: 94 kg (207 lb 4.8 oz).  Physical Exam    GENERAL APPEARANCE: AAOx3, no distress. Well developed.    RESP: Lungs CTA bilaterally. No w/r/r. No distress     CV: RRR, S1/S2 present. No m/r/c.     ABDOMEN:  soft, nontender, no distention. No rebound or guarding.     EXT: No c/c/e in lower extremities b/l. No rashes or deformities noted.    PSYCH: appropriate mood and affect.           ASSESSMENT / PLAN:   Marina was seen today for physical.    Diagnoses and all orders for this visit:    Routine history and physical examination of adult   Tdap administered today   Mammo scheduled   Cscope UTD    Mixed hyperlipidemia  Controlled on statin  -     CBC with Platelets & Differential; Future  -     Comprehensive metabolic panel; Future  -     Lipid panel reflex to direct LDL Fasting    Recurrent major depression in remission (H)  She self adjusted her dose at home and is feeling better due to increased depression. This works much better for her. Refill. She will let me know if worsening again. She incorporates regular exercise and yoga which helps, as well.  -     FLUoxetine (PROZAC) 20 " MG capsule; Take 2 capsules (40 mg) by mouth daily        Counseling Resources:  ATP IV Guidelines  Pooled Cohorts Equation Calculator  Breast Cancer Risk Calculator  Breast Cancer: Medication to Reduce Risk  FRAX Risk Assessment  ICSI Preventive Guidelines  Dietary Guidelines for Americans, 2010  USDA's MyPlate  ASA Prophylaxis  Lung CA Screening    DO JUJU Leone [facility] [address] YAW    Identified Health Risks:

## 2022-12-07 NOTE — PROGRESS NOTES
Marina is a 67 year old  female who presents for annual exam.     Besides routine health maintenance,  she would like to discuss vaginal dryness and irritation.    Do you have a Health Care Directive?: Yes, advance care planning is on file.      HPI:  Here today for GYN exam --has not been to our office since 2019.  PCP is Dr. lAvarado whom she saw last week.  Postmenopausal.  No vb/spotting.   Has noted some vaginal irritation/dryness over the last couple of years.  No discharge, itching, etc.  Not SA.  Has not used any OTC or topical products.  No bowel/bladder issues.  Denies hot flushes or night sweats    ; retired; 1 grown daughter; very good friends with Karena Smith  -staying active; doing yoga at home 3-4x/wk; also walking and started going to [employer] for workouts  +mammo next week; +SBE --no issues  PCP -Dr. Alvarado; had yearly exam and bloodwork last month; follows labs, meds, depression, etc  -up to date on colonoscopy  -last bone scan was in 2019 (spine -1.1, L hip -1.5, R hip -1.6)--taking ca++, vit D; will repeat this year  -has had covid and flu shots this season    GYNECOLOGIC HISTORY:  No LMP recorded. Patient is postmenopausal..   reports that she quit smoking about 43 years ago. Her smoking use included cigarettes. She started smoking about 45 years ago. She has a 2.50 pack-year smoking history. She has never used smokeless tobacco.    Patient is not sexually active.  STD testing offered?  Declined  Last PHQ-9 score on record=   PHQ-9 SCORE 2022   PHQ-9 Total Score MyChart -   PHQ-9 Total Score 0     Last GAD7 score on record=   JAY-7 SCORE 10/22/2019 2020 2022   Total Score 4 3 0     Alcohol Score = 2    HEALTH MAINTENANCE:  Cholesterol:   Cholesterol   Date Value Ref Range Status   2022 169 <200 mg/dL Final   2021 128 <200 mg/dL Final   03/10/2021 143 <200 mg/dL Final   2020 260 (H) <200 mg/dL Final     Comment:     Desirable:       <200 mg/dl     Last Mammo: One year ago, Result: Normal, Next Mammo: Scheduled on Monday   Pap:   Lab Results   Component Value Date    PAP NIL 10/22/2019    PAP NIL 05/10/2016    10/22/2019 WNL HPV (-)neg  DEXA:    Colonoscopy:  , Result:  Normal, Next Colonoscopy: no further indicated.    Health maintenance updated:  yes    HISTORY:  OB History    Para Term  AB Living   1 1 1 0 0 1   SAB IAB Ectopic Multiple Live Births   0 0 0 0 1      # Outcome Date GA Lbr Sammy/2nd Weight Sex Delivery Anes PTL Lv   1 Term     F CS-Unspec   CLAUDE      Name: Sonia Aaron     Patient Active Problem List   Diagnosis     Pain in joint, lower leg     Edema     Tibia/fibula fracture     Recurrent major depression in remission (H)     Mixed hyperlipidemia     Essential hypertension     Osteopenia after menopause     Past Surgical History:   Procedure Laterality Date     ABDOMEN SURGERY      laparoscopy for scar tissue adhesions     ARTHROPLASTY CARPOMETACARPAL (THUMB JOINT)  2014    Procedure: ARTHROPLASTY CARPOMETACARPAL (THUMB JOINT);  Surgeon: Ariana Kaminski MD;  Location:  [facility]      SECTION       HAND SURGERY  2019    left hand     KNEE SURGERY Right     fracture; TRIA     LAPAROSCOPY       RHINOPLASTY        Social History     Tobacco Use     Smoking status: Former     Packs/day: 0.50     Years: 5.00     Pack years: 2.50     Types: Cigarettes     Start date: 1977     Quit date: 1979     Years since quittin.2     Smokeless tobacco: Never     Tobacco comments:     less than 1/2 pack - social only   Substance Use Topics     Alcohol use: Yes     Comment: 2 drinks per week      Problem (# of Occurrences) Relation (Name,Age of Onset)    Cancer (1) Father (Cj)    Diabetes (1) Mother (Bety)    Hypertension (1) Mother (Bety): diest at 82,     Breast Cancer (1) Paternal Grandmother (Radha)    Coronary Artery Disease (1) Father (Cj): at age 69, was smoker, had  "laryngectomy    No Known Problems (6) Sister, Brother, Maternal Grandmother, Maternal Grandfather, Paternal Grandfather, Other            Current Outpatient Medications   Medication Sig     ASPIRIN PO Take 81 mg by mouth     BIOTIN 5000 PO      Coenzyme Q10 (CO Q 10) 10 MG CAPS      FLUoxetine (PROZAC) 20 MG capsule Take 2 capsules (40 mg) by mouth daily     Multiple Vitamins-Minerals (MULTIVITAMIN OR) Take 1 tablet by mouth daily     Omega-3 Fatty Acids (OMEGA-3 FISH OIL PO) Take 1 tablet by mouth daily     rosuvastatin (CRESTOR) 20 MG tablet TAKE 1 TABLET BY MOUTH AT BEDTIME     VITAMIN D, CHOLECALCIFEROL, PO Take by mouth daily     No current facility-administered medications for this visit.       No Known Allergies    Past medical, surgical, social and family history were reviewed and updated in EPIC.    ROS:   12 point review of systems negative other than symptoms noted below or in the HPI.  Genitourinary: Vaginal Dryness  No urinary frequency or dysuria, bladder or kidney problems    EXAM:  /88   Ht 1.651 m (5' 5\")   Wt 93.9 kg (207 lb)   BMI 34.45 kg/m     BMI: Body mass index is 34.45 kg/m .    EXAM:  Constitutional: Appearance: Well nourished, well developed alert, in no acute distress  Neck:  Lymph Nodes:  No lymphadenopathy present    Thyroid:  Gland size normal, nontender, no nodules or masses present  on palpation  Chest:  Respiratory Effort:  Breathing unlabored  Cardiovascular:Heart    Auscultation:  Regular rate, normal rhythm, no murmurs present  Breasts: Palpation of Breasts and Axillae:  No masses present on palpation, no breast tenderness., Axillary Lymph Nodes:  No lymphadenopathy present. and No nodularity, asymmetry or nipple discharge bilaterally.  Gastrointestinal:  Abdominal Examination:  Abdomen nontender to palpation, tone normal without     rigidity or guarding, no masses present, umbilicus without lesions    Liver and speen:  No hepatomegaly present, liver nontender to " palpation    Hernias:  No hernias present  Lymphatic: Lymph Nodes:  No other lymphadenopathy present  Skin:  General Inspection:  No rashes present, no lesions present, no areas of  discoloration.    Genitalia and Groin:  No rashes present, no lesions present, no areas of  discoloration, no masses present  Neurologic/Psychiatric:    Mental Status:  Oriented X3     Pelvic Exam:  External Genitalia:     Normal appearance for age, no discharge present, no tenderness present, no inflammatory lesions present, color normal  Vagina:     Normal vaginal vault without central or paravaginal defects, ATROPHIC  Bladder:     Nontender to palpation  Urethra:   Urethral Body:  Urethra palpation normal, urethra structural support normal   Urethral Meatus:  No erythema or lesions present  Cervix:     Appearance healthy, no lesions present, nontender to palpation, no bleeding present  Uterus:     Nontender to palpation, no masses present, position anteflexed, mobility: normal  Adnexa:     No adnexal tenderness present, no adnexal masses present  Perineum:     Perineum within normal limits, no evidence of trauma, no rashes or skin lesions present  Inguinal Lymph Nodes:     No lymphadenopathy present      COUNSELING:   Reviewed preventive health counseling, as reflected in patient instructions  Special attention given to:       Regular exercise       Healthy diet/nutrition       Osteoporosis prevention/bone health       (Yamilet)menopause management    BMI:  Body mass index is 34.45 kg/m .  Weight management plan: Discussed healthy diet and exercise guidelines Patient was referred to their PCP to discuss a diet and exercise plan.   reports that she quit smoking about 43 years ago. Her smoking use included cigarettes. She started smoking about 45 years ago. She has a 2.50 pack-year smoking history. She has never used smokeless tobacco.      ASSESSMENT:  67 year old female with satisfactory annual exam.    ICD-10-CM    1. Encounter for  gynecological examination without abnormal finding  Z01.419       2. Osteopenia after menopause  M85.80 DX Hip/Pelvis/Spine    Z78.0       3. Vaginal atrophy  N95.2           PLAN:  Patient Instructions   Follow up with your primary care provider for your other medical problems.  Continue self breast exam.  Increase physical activity and exercise.  Usual safety and preventative measures counseling done.  BMI >25  Weight loss encouraged.  Last pap smear was normal.  No pap was obtained this year due to patient age per guidelines.  This was discussed with the patient and she agrees with the plan.  Discussed Osteoporosis screening as well as calcium and Vitamin D recommendations.  Will arrange repeat dexa scan this year due to history of osteopenia.   Discussed options for vaginal dryness/irritation; discussed avoidance of irritants including deodorant soap; not interested in vaginal estrogen.  May use cooled vaseline to help with irritation and act as barrier vs sweat, discharge, etc.      Sonia Bowers MD

## 2022-12-08 ENCOUNTER — OFFICE VISIT (OUTPATIENT)
Dept: OBGYN | Facility: CLINIC | Age: 67
End: 2022-12-08
Payer: COMMERCIAL

## 2022-12-08 VITALS
WEIGHT: 207 LBS | SYSTOLIC BLOOD PRESSURE: 132 MMHG | HEIGHT: 65 IN | DIASTOLIC BLOOD PRESSURE: 88 MMHG | BODY MASS INDEX: 34.49 KG/M2

## 2022-12-08 DIAGNOSIS — M85.80 OSTEOPENIA AFTER MENOPAUSE: ICD-10-CM

## 2022-12-08 DIAGNOSIS — N95.2 VAGINAL ATROPHY: ICD-10-CM

## 2022-12-08 DIAGNOSIS — Z01.419 ENCOUNTER FOR GYNECOLOGICAL EXAMINATION WITHOUT ABNORMAL FINDING: Primary | ICD-10-CM

## 2022-12-08 DIAGNOSIS — Z78.0 OSTEOPENIA AFTER MENOPAUSE: ICD-10-CM

## 2022-12-08 PROCEDURE — 99203 OFFICE O/P NEW LOW 30 MIN: CPT | Performed by: OBSTETRICS & GYNECOLOGY

## 2022-12-08 ASSESSMENT — ANXIETY QUESTIONNAIRES
GAD7 TOTAL SCORE: 0
5. BEING SO RESTLESS THAT IT IS HARD TO SIT STILL: NOT AT ALL
2. NOT BEING ABLE TO STOP OR CONTROL WORRYING: NOT AT ALL
IF YOU CHECKED OFF ANY PROBLEMS ON THIS QUESTIONNAIRE, HOW DIFFICULT HAVE THESE PROBLEMS MADE IT FOR YOU TO DO YOUR WORK, TAKE CARE OF THINGS AT HOME, OR GET ALONG WITH OTHER PEOPLE: NOT DIFFICULT AT ALL
7. FEELING AFRAID AS IF SOMETHING AWFUL MIGHT HAPPEN: NOT AT ALL
3. WORRYING TOO MUCH ABOUT DIFFERENT THINGS: NOT AT ALL
1. FEELING NERVOUS, ANXIOUS, OR ON EDGE: NOT AT ALL
6. BECOMING EASILY ANNOYED OR IRRITABLE: NOT AT ALL
GAD7 TOTAL SCORE: 0

## 2022-12-08 ASSESSMENT — PATIENT HEALTH QUESTIONNAIRE - PHQ9
5. POOR APPETITE OR OVEREATING: NOT AT ALL
SUM OF ALL RESPONSES TO PHQ QUESTIONS 1-9: 0

## 2022-12-08 NOTE — PATIENT INSTRUCTIONS
Follow up with your primary care provider for your other medical problems.  Continue self breast exam.  Increase physical activity and exercise.  Usual safety and preventative measures counseling done.  BMI >25  Weight loss encouraged.  Last pap smear was normal.  No pap was obtained this year due to patient age per guidelines.  This was discussed with the patient and she agrees with the plan.  Discussed Osteoporosis screening as well as calcium and Vitamin D recommendations.  Will arrange repeat dexa scan this year due to history of osteopenia.   Discussed options for vaginal dryness/irritation; discussed avoidance of irritants including deodorant soap; not interested in vaginal estrogen.  May use cooled vaseline to help with irritation and act as barrier vs sweat, discharge, etc.

## 2022-12-26 ENCOUNTER — E-VISIT (OUTPATIENT)
Dept: FAMILY MEDICINE | Facility: CLINIC | Age: 67
End: 2022-12-26
Payer: COMMERCIAL

## 2022-12-26 ENCOUNTER — TELEPHONE (OUTPATIENT)
Dept: FAMILY MEDICINE | Facility: CLINIC | Age: 67
End: 2022-12-26

## 2022-12-26 DIAGNOSIS — H92.09 EAR ACHE: Primary | ICD-10-CM

## 2022-12-26 DIAGNOSIS — J32.9 SINUSITIS, UNSPECIFIED CHRONICITY, UNSPECIFIED LOCATION: Primary | ICD-10-CM

## 2022-12-26 PROCEDURE — 99207 PR NON-BILLABLE SERV PER CHARTING: CPT | Performed by: INTERNAL MEDICINE

## 2022-12-26 RX ORDER — AZITHROMYCIN 250 MG/1
TABLET, FILM COATED ORAL
Qty: 6 TABLET | Refills: 0 | Status: SHIPPED | OUTPATIENT
Start: 2022-12-26 | End: 2022-12-31

## 2022-12-26 NOTE — TELEPHONE ENCOUNTER
Called patient with MD message below. See agrees with plan to follow up if symptoms persist or worsen.

## 2022-12-26 NOTE — PATIENT INSTRUCTIONS
Thank you for choosing us for your care. I think an in-clinic visit would be best next steps based on your symptoms. Please schedule a clinic appointment; you won t be charged for this eVisit.      You can schedule an appointment right here in [facility], or call [phone]

## 2022-12-26 NOTE — TELEPHONE ENCOUNTER
Received call from patient. Pt had E-visit today with Dr. Felipe. Pt states that after the E-visits, she relazied that she has had the exact same sx as when she gets a sinus infection pt states location is just a little different, more in temple area. Pt states that the pain in not inside her ear, but outside.     Pt states she feels the same pressure and that when she gets sinus infections she gets dark circles under her eyes and she dismissed it this time around d/t the holidays.      Pt states she understand if provider not able to give Rx for z cynthia, but wanted to relay message that sx are the same as in the past.     Pharmacy: [facility]    Can we leave a detailed message on this number? YES  Phone number patient can be reached at: Home number on file [phone] (home)    Candelario Hyde RN  [facility] Triage     Topical Clindamycin Counseling: Patient counseled that this medication may cause skin irritation or allergic reactions.  In the event of skin irritation, the patient was advised to reduce the amount of the drug applied or use it less frequently.   The patient verbalized understanding of the proper use and possible adverse effects of clindamycin.  All of the patient's questions and concerns were addressed.

## 2023-02-06 ENCOUNTER — ANCILLARY PROCEDURE (OUTPATIENT)
Dept: MAMMOGRAPHY | Facility: CLINIC | Age: 68
End: 2023-02-06
Attending: OBSTETRICS & GYNECOLOGY
Payer: COMMERCIAL

## 2023-02-06 DIAGNOSIS — Z12.31 VISIT FOR SCREENING MAMMOGRAM: ICD-10-CM

## 2023-02-06 PROCEDURE — 77067 SCR MAMMO BI INCL CAD: CPT | Mod: TC | Performed by: RADIOLOGY

## 2023-02-06 PROCEDURE — 77063 BREAST TOMOSYNTHESIS BI: CPT | Mod: TC | Performed by: RADIOLOGY

## 2023-02-13 ENCOUNTER — ANCILLARY PROCEDURE (OUTPATIENT)
Dept: BONE DENSITY | Facility: CLINIC | Age: 68
End: 2023-02-13
Attending: OBSTETRICS & GYNECOLOGY
Payer: COMMERCIAL

## 2023-02-13 DIAGNOSIS — M85.80 OSTEOPENIA AFTER MENOPAUSE: ICD-10-CM

## 2023-02-13 DIAGNOSIS — Z78.0 OSTEOPENIA AFTER MENOPAUSE: ICD-10-CM

## 2023-02-13 PROCEDURE — 77080 DXA BONE DENSITY AXIAL: CPT | Performed by: OBSTETRICS & GYNECOLOGY

## 2023-02-13 NOTE — LETTER
2023      Marina Escudero  [address] [address] [place] [place] [address]        Dear ,    We are writing to inform you of your test results.    Resulted Orders   DX Hip/Pelvis/Spine    Narrative    Table formatting from the original result was not included.  DX Hip/Pelvis/Spine  Order #: [id number] Accession #: [id number]  Study Notes       Britt Montgomery on 2023 11:06 AM   BONE DENSITOMETRY  [address] [address]  2023      PATIENT: Marina Escudero  CHART: [id number]   :  1955  AGE:  67 year old  SEX:  female   REFERRING PHYSICIAN:  Sonia Bowers        PROCEDURE:  Bone density scanning was performed using DXA technology of   the lumbar spine and hip.  Scanning was performed on a Lunar Prodigy   scanner.  Reporting is completed in the form of a T-score.  The T-score   represents the standard deviation from peak bone mass based on a young   healthy adult.     REFERENCE T-SCORES:       Normal                -1.0 and greater                                 Osteopenia         Between -1.0 and -2.5                                             Osteoporosis     -2.5 and less                                       RISK FACTORS:  Post-menopausal, Fracture of knee (age 61, trauma), foot   (age 60, trauma)   CURRENT TREATMENT:  Vitamin D     FINDINGS:               Lumbar Spine (L1-L4)      T-score:  -0.9               Left Femoral Neck            T-score:  -1.6               Right Femoral Neck          T-score:  -1.9                            Lumbar (L1-L4) BMD: 1.083  Previous: 1.056                          Total Hip Mean BMD: 0.939  Previous: 0.933     Comparison is made to another DXA performed on the same Lunar Prodigy    machine on 12/10/19.        The T score of the lumbar spine is -0.9 with L1 at -1.8 and L1-L2 average   at -1.5 and consistent with normal bone density with focal moderate   osteopenia. This was previously -1.1,  "-1.8 and -1.5 respectively  Compared to prior dexa there has been a clinically significant improvement   of 2.6% mostly in L4    The T score of the left femoral neck is -1.6 consistent with moderate   osteopenia. This was previously -1.5  The T score of the right femoral neck is -1.9 consistent with moderate   osteopenia. This was previously -1.6  Compared to prior dexa there has been a clinically insignificant   improvement of 0.6% in the overall BMD of the total worst hip but more   clinically relevant decline in the T score of the right femoral neck   specifically    FRAX calculation based on the worst hip shows a 10 year probability of any   fracture related to osteoporosis of 10% and of hip fracture of 1.6%    The current NOF Guidelines recommend treatment for patients with prior hip   or vertebral fracture, T-score -2.5 or below, or 10 year risk of any major   osteoporotic fracture >20% or 10 year risk of hip fracture >3%, as   calculated using the FRAX calculator (www.shef.ac.uk/FRAX or you can   google \"FRAX\").      Based on these guidelines, treatment (in addition to calcium and vitamin   D) IS NOT recommended for this patient.  This is meant as an aid to clinical decision making; one must still use   clinical judgement.    Arline Osorio MD               If you have any questions or concerns, please call the clinic at the number listed above.       Sincerely,      Sonia Bowers MD          "

## 2023-02-13 NOTE — LETTER
[facility] [address] [address]  Phone: [phone]  Fax: [phone]      Marina Escudero     Date:  2/14/2023   [address]  [place] [address]      Thank you for having your DEXA scan performed.  As you recall, the DEXA scan evaluates the strength of your bones.  It is important to know the strength of your bones to help you avoid breaking bones in the future.    A T score shows your risk for breaking a bone.  Normal bone has a T score of -0.9 or higher.  Some bone thinning (osteopenia) has a T score between -1.0 and -2.4.  A lot of bone thinning (osteoporosis) has a T score of -2.5 or lower.    Your T scores on 2/13/2023 were:     Left Hip:  -1.6 score    Right Hip:  -1.9 score    This T score shows you have some thinning (Osteopenia) Compared with your previous scan, this shows more bone loss.    Spine:  -0.9 score      This T score shows you have normal bone Compared with your previous scan, this shows improvement.      The good news is that there are treatments for making your bones stronger, if you need them.    Ways you can make your bones stronger:    Weight bearing exercises such as walking.  Eat three servings of dairy a day, or take calcium each day (9701-7579 mg).  Take Vitamin D each day (1,000-2,000 IU).    For more information, please make an appointment with your Primary Care Provider, request a brochure from our office, or look on our website and go to the Bone Density page.     No need to make an appointment.    Marina, continue Vitamin D, Calcium and exercise.    Overall your bones are very stable.  You've had just slight worsening of the bone density in both hips but not to the range where we need to start medication.  Keep working hard on your calcium, vitamin D and weight bearing exercise and we'll plan to repeat your bone scan in 2-3 years.    Thank you.    Sonia Bowers MD

## 2023-02-14 PROBLEM — Z78.0 OSTEOPENIA AFTER MENOPAUSE: Status: ACTIVE | Noted: 2022-12-08

## 2023-02-14 PROBLEM — M85.80 OSTEOPENIA AFTER MENOPAUSE: Status: ACTIVE | Noted: 2022-12-08

## 2023-10-09 ENCOUNTER — MYC MEDICAL ADVICE (OUTPATIENT)
Dept: OBGYN | Facility: CLINIC | Age: 68
End: 2023-10-09
Payer: COMMERCIAL

## 2023-10-09 DIAGNOSIS — E78.5 HYPERLIPIDEMIA LDL GOAL <130: ICD-10-CM

## 2023-10-09 RX ORDER — ROSUVASTATIN CALCIUM 20 MG/1
20 TABLET, COATED ORAL AT BEDTIME
Qty: 60 TABLET | Refills: 0 | Status: SHIPPED | OUTPATIENT
Start: 2023-10-09 | End: 2023-12-04

## 2023-12-02 DIAGNOSIS — E78.5 HYPERLIPIDEMIA LDL GOAL <130: ICD-10-CM

## 2023-12-04 RX ORDER — ROSUVASTATIN CALCIUM 20 MG/1
20 TABLET, COATED ORAL AT BEDTIME
Qty: 90 TABLET | Refills: 0 | Status: SHIPPED | OUTPATIENT
Start: 2023-12-04 | End: 2024-03-25

## 2023-12-04 NOTE — TELEPHONE ENCOUNTER
"Requested Prescriptions   Pending Prescriptions Disp Refills    rosuvastatin (CRESTOR) 20 MG tablet [Pharmacy Med Name: ROSUVASTATIN CALCIUM 20 MG TAB] 90 tablet 1     Sig: TAKE 1 TABLET BY MOUTH AT BEDTIME       Statins Protocol Failed - 12/2/2023 12:31 PM        Failed - LDL on file in past 12 months     Recent Labs   Lab Test 11/11/22  0752   LDL 67             Passed - No abnormal creatine kinase in past 12 months     No lab results found.             Passed - Recent (12 mo) or future (30 days) visit within the authorizing provider's specialty     Patient has had an office visit with the authorizing provider or a provider within the authorizing providers department within the previous 12 mos or has a future within next 30 days. See \"Patient Info\" tab in inbasket, or \"Choose Columns\" in Meds & Orders section of the refill encounter.              Passed - Medication is active on med list        Passed - Patient is age 18 or older        Passed - No active pregnancy on record        Passed - No positive pregnancy test in past 12 months           Last Written Prescription Date:  10/9/23  Last Fill Quantity: 60,  # refills: 0   Last office visit: 12/8/2022 ; last virtual visit: Visit date not found with prescribing provider:  Robinson   Future Office Visit: 1/25/24 with Dr. Felipe     Medication is being filled for 1 time refill only due to:  Patient needs to be seen because due for annual.  Appointment scheduled.  Sarika Armstrong RN on 12/4/2023 at 6:19 AM        "

## 2024-01-21 ASSESSMENT — PATIENT HEALTH QUESTIONNAIRE - PHQ9
SUM OF ALL RESPONSES TO PHQ QUESTIONS 1-9: 2
10. IF YOU CHECKED OFF ANY PROBLEMS, HOW DIFFICULT HAVE THESE PROBLEMS MADE IT FOR YOU TO DO YOUR WORK, TAKE CARE OF THINGS AT HOME, OR GET ALONG WITH OTHER PEOPLE: NOT DIFFICULT AT ALL
SUM OF ALL RESPONSES TO PHQ QUESTIONS 1-9: 2

## 2024-01-22 ENCOUNTER — PATIENT OUTREACH (OUTPATIENT)
Dept: CARE COORDINATION | Facility: CLINIC | Age: 69
End: 2024-01-22

## 2024-01-22 ENCOUNTER — VIRTUAL VISIT (OUTPATIENT)
Dept: FAMILY MEDICINE | Facility: CLINIC | Age: 69
End: 2024-01-22
Payer: COMMERCIAL

## 2024-01-22 DIAGNOSIS — F33.40 RECURRENT MAJOR DEPRESSION IN REMISSION (H): Primary | ICD-10-CM

## 2024-01-22 DIAGNOSIS — Z78.0 OSTEOPENIA AFTER MENOPAUSE: ICD-10-CM

## 2024-01-22 DIAGNOSIS — I10 ESSENTIAL HYPERTENSION: Chronic | ICD-10-CM

## 2024-01-22 DIAGNOSIS — E78.2 MIXED HYPERLIPIDEMIA: Chronic | ICD-10-CM

## 2024-01-22 DIAGNOSIS — M85.80 OSTEOPENIA AFTER MENOPAUSE: ICD-10-CM

## 2024-01-22 PROCEDURE — 99213 OFFICE O/P EST LOW 20 MIN: CPT | Mod: 95 | Performed by: INTERNAL MEDICINE

## 2024-01-22 NOTE — PROGRESS NOTES
Marina is a 68 year old who is being evaluated via a billable video visit.      How would you like to obtain your AVS? MyChart  If the video visit is dropped, the invitation should be resent by: Text to cell phone: [phone]  Will anyone else be joining your video visit? No        Assessment & Plan     Recurrent major depression in remission (H24)  Stable on current meds.   Continue meds.   No SI    Mixed hyperlipidemia  - Lipid Profile; Future    Essential hypertension  Stable off meds. She lost weight, eats healthy.    Osteopenia after menopause  Continue calcium and vit D       See Patient Instructions    Subjective   Marina is a 68 year old, presenting for the following health issues:  No chief complaint on file.    History of Present Illness       Mental Health Follow-up:  Patient presents to follow-up on Depression.Patient's depression since last visit has been:  No change  The patient is not having other symptoms associated with depression.      Any significant life events: relationship concerns  Patient is not feeling anxious or having panic attacks.  Patient has no concerns about alcohol or drug use.    Hyperlipidemia:  She presents for follow up of hyperlipidemia.   She is taking medication to lower cholesterol. She is not having myalgia or other side effects to statin medications.    She eats 2-3 servings of fruits and vegetables daily.She consumes 1 sweetened beverage(s) daily.She exercises with enough effort to increase her heart rate 10 to 19 minutes per day.  She exercises with enough effort to increase her heart rate 4 days per week.   She is taking medications regularly.     Marina is here virtually to establish care.   HTN: not on meds currently  HLD: currently on crestor 20 mg daily.   Depression: she takes prozac 20 mg daily, regularly in winter, but in summers she takes every other day.   Osteopenia: dexa scan in 2023 and takes calcium and vitamin D    Former smoker: quit 20 years, she used to  smoke socially for about 15 years, 2-4 cigs per week.   ETOH: rarely. Only socially, maybe 3 drinks per month.    Fam hx: DM2 in mother. Father with laryngeal cancer and CAD and was a smoker.     Mammogram scheduled.   UTD with colonoscopy.         Objective           Vitals:  No vitals were obtained today due to virtual visit.    Physical Exam   GEN: No acute distress  RESP: No audible increased work of breathing. Patient speaking in full sentences without distress.  PSYCH: pleasant  Exam otherwise limited due to virtual platform        Video-Visit Details    Type of service:  Video Visit   Video Start Time:  9: 15 am   Video End Time: 9: 30 am   Originating Location (pt. Location): Home  Distant Location (provider location):  On-site  Platform used for Video Visit: Jada  Signed Electronically by: Sharita Felipe MD

## 2024-01-29 ENCOUNTER — LAB (OUTPATIENT)
Dept: LAB | Facility: CLINIC | Age: 69
End: 2024-01-29
Payer: COMMERCIAL

## 2024-01-29 DIAGNOSIS — E78.2 MIXED HYPERLIPIDEMIA: Chronic | ICD-10-CM

## 2024-01-29 LAB
CHOLEST SERPL-MCNC: 135 MG/DL
FASTING STATUS PATIENT QL REPORTED: YES
HDLC SERPL-MCNC: 54 MG/DL
LDLC SERPL CALC-MCNC: 52 MG/DL
NONHDLC SERPL-MCNC: 81 MG/DL
TRIGL SERPL-MCNC: 145 MG/DL

## 2024-01-29 PROCEDURE — 36415 COLL VENOUS BLD VENIPUNCTURE: CPT

## 2024-01-29 PROCEDURE — 80061 LIPID PANEL: CPT

## 2024-02-18 ENCOUNTER — HEALTH MAINTENANCE LETTER (OUTPATIENT)
Age: 69
End: 2024-02-18

## 2024-02-27 ENCOUNTER — ANCILLARY PROCEDURE (OUTPATIENT)
Dept: MAMMOGRAPHY | Facility: CLINIC | Age: 69
End: 2024-02-27
Payer: COMMERCIAL

## 2024-02-27 DIAGNOSIS — Z12.31 VISIT FOR SCREENING MAMMOGRAM: ICD-10-CM

## 2024-02-27 PROCEDURE — 77063 BREAST TOMOSYNTHESIS BI: CPT | Mod: TC | Performed by: RADIOLOGY

## 2024-02-27 PROCEDURE — 77067 SCR MAMMO BI INCL CAD: CPT | Mod: TC | Performed by: RADIOLOGY

## 2024-03-25 DIAGNOSIS — E78.5 HYPERLIPIDEMIA LDL GOAL <130: ICD-10-CM

## 2024-03-25 RX ORDER — ROSUVASTATIN CALCIUM 20 MG/1
20 TABLET, COATED ORAL AT BEDTIME
Qty: 90 TABLET | Refills: 1 | Status: SHIPPED | OUTPATIENT
Start: 2024-03-25 | End: 2024-03-25

## 2024-03-25 RX ORDER — ROSUVASTATIN CALCIUM 20 MG/1
20 TABLET, COATED ORAL AT BEDTIME
Qty: 90 TABLET | Refills: 1 | Status: SHIPPED | OUTPATIENT
Start: 2024-03-25 | End: 2024-09-13

## 2024-03-25 NOTE — TELEPHONE ENCOUNTER
Requested Prescriptions   Pending Prescriptions Disp Refills    rosuvastatin (CRESTOR) 20 MG tablet [Pharmacy Med Name: ROSUVASTATIN CALCIUM 20 MG TAB] 90 tablet 0     Sig: TAKE 1 TABLET BY MOUTH EVERYDAY AT BEDTIME       Antihyperlipidemic agents Failed - 3/25/2024  2:30 PM        Failed - Recent (12 mo) or future (90 days) visit within the authorizing provider's specialty     The patient must have completed an in-person or virtual visit within the past 12 months or has a future visit scheduled within the next 90 days with the authorizing provider s specialty.  Urgent care and e-visits do not quality as an office visit for this protocol.          Passed - LDL on file in the past 12 months        Passed - Medication is active on med list        Passed - Patient is age 18 years or older        Passed - No active pregnancy on record        Passed - No positive pregnancy test in past 12 mos           Last Written Prescription Date:  12/4/23  Last Fill Quantity: #90, 0 refills  Last office visit: 12/8/22    Prescription approved per [facility] Refill Protocol.    Shirin Weir RN on 3/25/2024 at 2:46 PM

## 2024-05-09 ENCOUNTER — PATIENT OUTREACH (OUTPATIENT)
Dept: CARE COORDINATION | Facility: CLINIC | Age: 69
End: 2024-05-09
Payer: COMMERCIAL

## 2024-06-30 ENCOUNTER — PATIENT OUTREACH (OUTPATIENT)
Dept: CARE COORDINATION | Facility: CLINIC | Age: 69
End: 2024-06-30
Payer: COMMERCIAL

## 2024-07-24 ASSESSMENT — PATIENT HEALTH QUESTIONNAIRE - PHQ9
10. IF YOU CHECKED OFF ANY PROBLEMS, HOW DIFFICULT HAVE THESE PROBLEMS MADE IT FOR YOU TO DO YOUR WORK, TAKE CARE OF THINGS AT HOME, OR GET ALONG WITH OTHER PEOPLE: NOT DIFFICULT AT ALL
SUM OF ALL RESPONSES TO PHQ QUESTIONS 1-9: 2
SUM OF ALL RESPONSES TO PHQ QUESTIONS 1-9: 2

## 2024-07-25 ENCOUNTER — VIRTUAL VISIT (OUTPATIENT)
Dept: FAMILY MEDICINE | Facility: CLINIC | Age: 69
End: 2024-07-25
Payer: COMMERCIAL

## 2024-07-25 DIAGNOSIS — J01.90 ACUTE SINUSITIS WITH SYMPTOMS > 10 DAYS: Primary | ICD-10-CM

## 2024-07-25 PROCEDURE — 99213 OFFICE O/P EST LOW 20 MIN: CPT | Mod: 95 | Performed by: PHYSICIAN ASSISTANT

## 2024-07-25 RX ORDER — AZITHROMYCIN 250 MG/1
TABLET, FILM COATED ORAL
Qty: 6 TABLET | Refills: 0 | Status: SHIPPED | OUTPATIENT
Start: 2024-07-25 | End: 2024-07-30

## 2024-07-25 NOTE — PROGRESS NOTES
Zach is a 69 year old who is being evaluated via a billable video visit.    How would you like to obtain your AVS? [email or web address]  If the video visit is dropped, the invitation should be resent by: Text to cell phone: [phone]  Will anyone else be joining your video visit? No      Assessment & Plan     Acute sinusitis with symptoms > 10 days    Acute sinusitis with symptoms greater than 3 weeks.  Treat with empiric antibiotic therapy.  She has tolerated azithromycin well in the past.  Will send this to her pharmacy.  Okay to use Flonase, antihistamines, Mucinex to help symptomatically.  Stay well-hydrated.  Keep myself and PCP posted if symptoms do not improve or worsen.    - azithromycin (ZITHROMAX) 250 MG tablet  Dispense: 6 tablet; Refill: 0      Subjective   Zach is a very pleasant 69 year old, presenting for the following health issues:  Sinus Problem    See recent Pixim message.    3-week history of sinus pressure/congestion with associated yellow nasal drainage and postnasal drip.  Cough secondary to postnasal drip.  Denies fever, chills, sweats.  No shortness of breath or chest pain.  No antibiotic allergies.  Was recently treated with her eye doctor for conjunctivitis which has resolved.    Video Start Time:  12:15 PM    History of Present Illness       Reason for visit:  Sinus issues and tight cough    She eats 2-3 servings of fruits and vegetables daily.She consumes 1 sweetened beverage(s) daily.She exercises with enough effort to increase her heart rate 10 to 19 minutes per day.  She exercises with enough effort to increase her heart rate 3 or less days per week.   She is taking medications regularly.         Review of Systems  Constitutional, neuro, ENT, endocrine, pulmonary, cardiac, gastrointestinal, genitourinary, musculoskeletal, integument and psychiatric systems are negative, except as otherwise noted.      Objective    Vitals - Patient Reported  Pain Score: Mild Pain (2)  Pain Loc: Eye (pressure  between eyes)      Vitals:  No vitals were obtained today due to virtual visit.    Physical Exam   GENERAL: alert and no distress  EYES: Eyes grossly normal to inspection.  No discharge or erythema, or obvious scleral/conjunctival abnormalities.  RESP: No audible wheeze, cough, or visible cyanosis.    SKIN: Visible skin clear. No significant rash, abnormal pigmentation or lesions.  NEURO: Cranial nerves grossly intact.  Mentation and speech appropriate for age.  PSYCH: Appropriate affect, tone, and pace of words        Video-Visit Details    Type of service:  Video Visit   Video End Time: 12:25  Originating Location (pt. Location): Home    Distant Location (provider location):  On-site  Platform used for Video Visit: Doximity    The likelihood of other entities in the differential is insufficient to justify any further testing for them at this time. This was explained to the patient. The patient was advised that persistent or worsening symptoms would require further evaluation. Patient advised to call the office and if unable to reach to go to the emergency room if they develop any new or worsening symptoms. Expressed understanding and agreement with above stated plan.     Signed Electronically by: Aditya Landeros PA-C

## 2024-09-13 DIAGNOSIS — E78.5 HYPERLIPIDEMIA LDL GOAL <130: ICD-10-CM

## 2024-09-13 RX ORDER — ROSUVASTATIN CALCIUM 20 MG/1
20 TABLET, COATED ORAL AT BEDTIME
Qty: 90 TABLET | Refills: 0 | Status: SHIPPED | OUTPATIENT
Start: 2024-09-13

## 2024-11-07 ENCOUNTER — NURSE TRIAGE (OUTPATIENT)
Dept: FAMILY MEDICINE | Facility: CLINIC | Age: 69
End: 2024-11-07

## 2024-11-07 ENCOUNTER — OFFICE VISIT (OUTPATIENT)
Dept: URGENT CARE | Facility: URGENT CARE | Age: 69
End: 2024-11-07
Payer: COMMERCIAL

## 2024-11-07 VITALS
WEIGHT: 212 LBS | HEART RATE: 99 BPM | BODY MASS INDEX: 35.28 KG/M2 | DIASTOLIC BLOOD PRESSURE: 91 MMHG | RESPIRATION RATE: 16 BRPM | SYSTOLIC BLOOD PRESSURE: 133 MMHG | TEMPERATURE: 98.8 F | OXYGEN SATURATION: 97 %

## 2024-11-07 DIAGNOSIS — R09.81 CONGESTION OF PARANASAL SINUS: Primary | ICD-10-CM

## 2024-11-07 DIAGNOSIS — R05.8 DRY COUGH: ICD-10-CM

## 2024-11-07 DIAGNOSIS — R53.83 OTHER FATIGUE: ICD-10-CM

## 2024-11-07 PROCEDURE — 99213 OFFICE O/P EST LOW 20 MIN: CPT | Performed by: FAMILY MEDICINE

## 2024-11-07 RX ORDER — AMOXICILLIN 875 MG/1
875 TABLET, COATED ORAL 2 TIMES DAILY
Qty: 20 TABLET | Refills: 0 | Status: SHIPPED | OUTPATIENT
Start: 2024-11-12 | End: 2024-11-07

## 2024-11-07 RX ORDER — AMOXICILLIN 875 MG/1
875 TABLET, COATED ORAL 2 TIMES DAILY
Qty: 20 TABLET | Refills: 0 | Status: SHIPPED | OUTPATIENT
Start: 2024-11-12

## 2024-11-07 NOTE — TELEPHONE ENCOUNTER
"Pt reporting cold symptoms. Swelling around both eyes with drainage. Triage completed, no openings within dispo window. Pt agrees to be seen in .   1. LOCATION: \"Where does it hurt?\"       Both eyes   2. ONSET: \"When did the sinus pain start?\"  (e.g., hours, days)       Cold on Saturday   3. SEVERITY: \"How bad is the pain?\"   (Scale 1-10; mild, moderate or severe)    - MILD (1-3): doesn't interfere with normal activities     - MODERATE (4-7): interferes with normal activities (e.g., work or school) or awakens from sleep    - SEVERE (8-10): excruciating pain and patient unable to do any normal activities         Dry, moderate pain under both eyes   4. RECURRENT SYMPTOM: \"Have you ever had sinus problems before?\" If Yes, ask: \"When was the last time?\" and \"What happened that time?\"       Last July pt had infection, which totally cleared up and now symptoms have come back. Pt prone sinus in  5. NASAL CONGESTION: \"Is the nose blocked?\" If Yes, ask: \"Can you open it or must you breathe through your mouth?\"      Yes   6. NASAL DISCHARGE: \"Do you have discharge from your nose?\" If so ask, \"What color?\"      Nose green eyes green   7. FEVER: \"Do you have a fever?\" If Yes, ask: \"What is it, how was it measured, and when did it start?\"       Max 101 11/2/24  8. OTHER SYMPTOMS: \"Do you have any other symptoms?\" (e.g., sore throat, cough, earache, difficulty breathing)      Throat discomfort   9. PREGNANCY: \"Is there any chance you are pregnant?\" \"When was your last menstrual period?\"      No      Reason for Disposition   Redness or swelling on the cheek, forehead, or around the eye    Additional Information   Negative: Sounds like a life-threatening emergency to the triager   Negative: Difficulty breathing, and not from stuffy nose (e.g., not relieved by cleaning out the nose)   Negative: SEVERE headache and has fever   Negative: Patient sounds very sick or weak to the triager   Negative: SEVERE sinus pain   Negative: SEVERE " headache    Protocols used: Sinus Pain or Congestion-A-[place]

## 2024-11-07 NOTE — PATIENT INSTRUCTIONS
Start  claritin D or zyrtec D  medication then do saline nasal spray wait 10 minutes and then do the Flonase or nasacort  Start mucinex   Apply warm packs   Push more fluids   If any worsening hearing or ear pain or high fever or worsening cough  do follow up     Lyn Dumas MD

## 2024-11-07 NOTE — PROGRESS NOTES
Chief Complaint   Patient presents with    Facial Pain     Eye pressure, feeling ill x 5 days, not better    Cough     Nasal congestion     Fatigue       Marina was seen today for facial pain, cough and fatigue.    Diagnoses and all orders for this visit:    Congestion of paranasal sinus  -     Discontinue: amoxicillin (AMOXIL) 875 MG tablet; Take 1 tablet (875 mg) by mouth 2 times daily for 10 days.  -     amoxicillin (AMOXIL) 875 MG tablet; Take 1 tablet (875 mg) by mouth 2 times daily.    Other fatigue    Dry cough    Start  claritin D or zyrtec D  medication then do saline nasal spray wait 10 minutes and then do the Flonase or nasacort  Start mucinex   Apply warm packs   Push more fluids   If any worsening hearing or ear pain or high fever or worsening cough  do follow up   ASSESSMENT:      Congestion of paranasal sinus  Other fatigue  Dry cough    PLAN:   See orders in epic.   Symptomatic treat with  OTC analgesic as needed. Follow-up with primary clinic if not improving.  Start  claritin D or zyrtec D  medication then do saline nasal spray wait 10 minutes and then do the Flonase or nasacort  Start mucinex   Apply warm packs   Push more fluids   If any worsening hearing or ear pain or high fever or worsening cough  do follow up   I did review with patient that currently she does not have an antibiotic but as she has history of sinus infection patient was given a positive prescription for an antibiotic only to fill it if symptoms do not get better in 5 days.    Lyn Dumas MD     SUBJECTIVE:  Marina Escudero is a 69 year old female with a chief complaint of sinus congestion and dry cough and also facial pain along with clear drainage   Onset of symptoms was 5 day(s) ago.    Course of illness: gradual onset.  Severity moderate  Current and Associated symptoms: stuffy nose, cough - non-productive, and facial pain/pressure  Treatment measures tried include OTC Cough med.  Predisposing factors include None.    Past  Medical History:   Diagnosis Date    Depressive disorder     Essential hypertension 2021    Heart murmur     since childhood, asymptomatic    Hyperlipidemia LDL goal <130     Osteopenia after menopause     Recurrent major depression in remission (H)      Current Outpatient Medications   Medication Sig Dispense Refill    [START ON 2024] amoxicillin (AMOXIL) 875 MG tablet Take 1 tablet (875 mg) by mouth 2 times daily. 20 tablet 0    ASPIRIN PO Take 81 mg by mouth      BIOTIN 5000 PO       Coenzyme Q10 (CO Q 10) 10 MG CAPS       FLUoxetine (PROZAC) 20 MG capsule Take 2 capsules (40 mg) by mouth daily 180 capsule 3    Multiple Vitamins-Minerals (MULTIVITAMIN OR) Take 1 tablet by mouth daily      Omega-3 Fatty Acids (OMEGA-3 FISH OIL PO) Take 1 tablet by mouth daily      rosuvastatin (CRESTOR) 20 MG tablet TAKE 1 TABLET BY MOUTH AT BEDTIME 90 tablet 0    VITAMIN D, CHOLECALCIFEROL, PO Take by mouth daily       Social History     Tobacco Use    Smoking status: Former     Current packs/day: 0.00     Average packs/day: 0.5 packs/day for 5.0 years (2.5 ttl pk-yrs)     Types: Cigarettes     Start date: 1977     Quit date: 1979     Years since quittin.1    Smokeless tobacco: Never    Tobacco comments:     less than 1/2 pack - social only   Substance Use Topics    Alcohol use: Yes     Comment: 2 drinks per week       ROS:  Review of systems negative except as stated above.    OBJECTIVE:   BP (!) 133/91 (BP Location: Left arm, Patient Position: Sitting, Cuff Size: Adult Large)   Pulse 99   Temp 98.8  F (37.1  C) (Tympanic)   Resp 16   Wt 96.2 kg (212 lb)   SpO2 97%   BMI 35.28 kg/m    GENERAL APPEARANCE: healthy, alert and no distress  EYES: EOMI,  PERRL, conjunctiva clear  HENT: ear canals and TM fluid behind the ear drum  Nose normal.  Pharynx no erythema  NECK: supple, non-tender to palpation, no adenopathy noted  RESP: lungs clear to auscultation - no rales, rhonchi or wheezes  CV: regular  rates and rhythm, normal S1 S2, no murmur noted  PSYCH: mentation appears normal    Lyn Dumas MD

## 2024-11-20 SDOH — HEALTH STABILITY: PHYSICAL HEALTH: ON AVERAGE, HOW MANY DAYS PER WEEK DO YOU ENGAGE IN MODERATE TO STRENUOUS EXERCISE (LIKE A BRISK WALK)?: 3 DAYS

## 2024-11-20 SDOH — HEALTH STABILITY: PHYSICAL HEALTH: ON AVERAGE, HOW MANY MINUTES DO YOU ENGAGE IN EXERCISE AT THIS LEVEL?: 20 MIN

## 2024-11-20 ASSESSMENT — SOCIAL DETERMINANTS OF HEALTH (SDOH): HOW OFTEN DO YOU GET TOGETHER WITH FRIENDS OR RELATIVES?: MORE THAN THREE TIMES A WEEK

## 2024-11-25 ENCOUNTER — OFFICE VISIT (OUTPATIENT)
Dept: FAMILY MEDICINE | Facility: CLINIC | Age: 69
End: 2024-11-25
Payer: COMMERCIAL

## 2024-11-25 VITALS
HEART RATE: 79 BPM | WEIGHT: 206.7 LBS | TEMPERATURE: 97.8 F | DIASTOLIC BLOOD PRESSURE: 102 MMHG | RESPIRATION RATE: 16 BRPM | SYSTOLIC BLOOD PRESSURE: 162 MMHG | OXYGEN SATURATION: 98 % | HEIGHT: 65 IN | BODY MASS INDEX: 34.44 KG/M2

## 2024-11-25 DIAGNOSIS — R10.32 LEFT INGUINAL PAIN: ICD-10-CM

## 2024-11-25 DIAGNOSIS — R03.0 ELEVATED BLOOD PRESSURE READING WITHOUT DIAGNOSIS OF HYPERTENSION: ICD-10-CM

## 2024-11-25 DIAGNOSIS — F33.40 RECURRENT MAJOR DEPRESSION IN REMISSION (H): Chronic | ICD-10-CM

## 2024-11-25 DIAGNOSIS — Z00.00 ANNUAL PHYSICAL EXAM: Primary | ICD-10-CM

## 2024-11-25 DIAGNOSIS — E78.2 MIXED HYPERLIPIDEMIA: Chronic | ICD-10-CM

## 2024-11-25 PROBLEM — I10 ESSENTIAL HYPERTENSION: Chronic | Status: RESOLVED | Noted: 2021-02-22 | Resolved: 2024-11-25

## 2024-11-25 LAB
ERYTHROCYTE [DISTWIDTH] IN BLOOD BY AUTOMATED COUNT: 13.9 % (ref 10–15)
EST. AVERAGE GLUCOSE BLD GHB EST-MCNC: 114 MG/DL
HBA1C MFR BLD: 5.6 % (ref 0–5.6)
HCT VFR BLD AUTO: 38.6 % (ref 35–47)
HGB BLD-MCNC: 12.2 G/DL (ref 11.7–15.7)
MCH RBC QN AUTO: 26.7 PG (ref 26.5–33)
MCHC RBC AUTO-ENTMCNC: 31.6 G/DL (ref 31.5–36.5)
MCV RBC AUTO: 85 FL (ref 78–100)
PLATELET # BLD AUTO: 356 10E3/UL (ref 150–450)
RBC # BLD AUTO: 4.57 10E6/UL (ref 3.8–5.2)
WBC # BLD AUTO: 6.7 10E3/UL (ref 4–11)

## 2024-11-25 PROCEDURE — G0439 PPPS, SUBSEQ VISIT: HCPCS | Performed by: INTERNAL MEDICINE

## 2024-11-25 PROCEDURE — 80053 COMPREHEN METABOLIC PANEL: CPT | Performed by: INTERNAL MEDICINE

## 2024-11-25 PROCEDURE — 85027 COMPLETE CBC AUTOMATED: CPT | Performed by: INTERNAL MEDICINE

## 2024-11-25 PROCEDURE — 36415 COLL VENOUS BLD VENIPUNCTURE: CPT | Performed by: INTERNAL MEDICINE

## 2024-11-25 PROCEDURE — 83036 HEMOGLOBIN GLYCOSYLATED A1C: CPT | Mod: GZ | Performed by: INTERNAL MEDICINE

## 2024-11-25 ASSESSMENT — PATIENT HEALTH QUESTIONNAIRE - PHQ9
SUM OF ALL RESPONSES TO PHQ QUESTIONS 1-9: 1
SUM OF ALL RESPONSES TO PHQ QUESTIONS 1-9: 1
10. IF YOU CHECKED OFF ANY PROBLEMS, HOW DIFFICULT HAVE THESE PROBLEMS MADE IT FOR YOU TO DO YOUR WORK, TAKE CARE OF THINGS AT HOME, OR GET ALONG WITH OTHER PEOPLE: NOT DIFFICULT AT ALL

## 2024-11-25 NOTE — PROGRESS NOTES
Preventive Care Visit  [facility] YAW Felipe MD, Internal Medicine  Nov 25, 2024  {Provider  Link to [facility] :[id number]}    {PROVIDER CHARTING PREFERENCE:212163}    Jesus Serrano is a 69 year old, presenting for the following:  Physical          HPI  Marina Escudero is here for APE    Groin exam: no hernia. No tenderness. Not related to activity.   Exam all      Health Care Directive  Patient does not have a Health Care Directive: Discussed advance care planning with patient; however, patient declined at this time.      11/20/2024   General Health   How would you rate your overall physical health? Excellent   Feel stress (tense, anxious, or unable to sleep) To some extent      (!) STRESS CONCERN      11/20/2024   Nutrition   Diet: Carbohydrate counting            11/20/2024   Exercise   Days per week of moderate/strenous exercise 3 days   Average minutes spent exercising at this level 20 min            11/20/2024   Social Factors   Frequency of gathering with friends or relatives More than three times a week   Worry food won't last until get money to buy more No    No   Food not last or not have enough money for food? No    No   Do you have housing? (Housing is defined as stable permanent housing and does not include staying ouside in a car, in a tent, in an abandoned building, in an overnight shelter, or couch-surfing.) Yes    Yes   Are you worried about losing your housing? No    No   Lack of transportation? No    No   Unable to get utilities (heat,electricity)? No    No       Multiple values from one day are sorted in reverse-chronological order         11/20/2024   Fall Risk   Fallen 2 or more times in the past year? No    Trouble with walking or balance? No        Patient-reported          11/20/2024   Activities of Daily Living- Home Safety   Needs help with the following daily activites None of the above   Safety concerns in the home None of the above            11/20/2024   Dental    Dentist two times every year? Yes            2024   Hearing Screening   Hearing concerns? None of the above            2024   Driving Risk Screening   Patient/family members have concerns about driving No            2024   General Alertness/Fatigue Screening   Have you been more tired than usual lately? No            2024   Urinary Incontinence Screening   Bothered by leaking urine in past 6 months No            2024   TB Screening   Were you born outside of the [place]? No          Today's PHQ-9 Score:       2024     9:21 AM   PHQ-9 SCORE   PHQ-9 Total Score MyChart 1 (Minimal depression)   PHQ-9 Total Score 1        Patient-reported         2024   Substance Use   Alcohol more than 3/day or more than 7/wk No   Do you have a current opioid prescription? No   How severe/bad is pain from 1 to 10? 0/10 (No Pain)   Do you use any other substances recreationally? (!) CANNABIS PRODUCTS        Social History     Tobacco Use    Smoking status: Former     Current packs/day: 0.00     Average packs/day: 0.5 packs/day for 5.0 years (2.5 ttl pk-yrs)     Types: Cigarettes     Start date: 1977     Quit date: 1979     Years since quittin.2    Smokeless tobacco: Never    Tobacco comments:     less than 1/2 pack - social only   Vaping Use    Vaping status: Never Used   Substance Use Topics    Alcohol use: Yes     Comment: 2 drinks per week    Drug use: No           2024   LAST FHS-7 RESULTS   1st degree relative breast or ovarian cancer No   Any relative bilateral breast cancer No   Any male have breast cancer No   Any ONE woman have BOTH breast AND ovarian cancer No   Any woman with breast cancer before 50yrs No   2 or more relatives with breast AND/OR ovarian cancer No   2 or more relatives with breast AND/OR bowel cancer No          Mammogram Screening - Mammogram every 1-2 years updated in Health Maintenance based on mutual decision making      History of abnormal Pap  smear: No - age 65 or older with adequate negative prior screening test results (3 consecutive negative cytology results, 2 consecutive negative cotesting results, or 2 consecutive negative HrHPV test results within 10 years, with the most recent test occurring within the recommended screening interval for the test used)        Latest Ref Rng & Units 10/22/2019     1:46 PM 10/22/2019     1:30 PM 5/10/2016    12:00 AM   PAP / HPV   PAP (Historical)  NIL   NIL    HPV 16 DNA NEG^Negative  Negative     HPV 18 DNA NEG^Negative  Negative     Other HR HPV NEG^Negative  Negative       ASCVD Risk   The 10-year ASCVD risk score (Loly AMBROSIO, et al., 2019) is: 13.1%    Values used to calculate the score:      Age: 69 years      Sex: Female      Is Non- : No      Diabetic: No      Tobacco smoker: No      Systolic Blood Pressure: 162 mmHg      Is BP treated: No      HDL Cholesterol: 54 mg/dL      Total Cholesterol: 135 mg/dL    Fracture Risk Assessment Tool  Link to Frax Calculator  Use the information below to complete the Frax calculator  : 1955  Sex: female  Weight (kg): 93.8 kg (actual weight)  Height (cm): 165.1 cm  Previous Fragility Fracture:  Yes  History of parent with fractured hip:  No  Current Smoking:  No  Patient has been on glucocorticoids for more than 3 months (5mg/day or more): No  Rheumatoid Arthritis on Problem List:  No  Secondary Osteoporosis on Problem List:  No  Consumes 3 or more units of alcohol per day: No  Femoral Neck BMD (g/cm2)    Reviewed and updated as needed this visit by Provider                  Current providers sharing in care for this patient include:  Patient Care Team:  Sharita Felipe MD as PCP - General (Internal Medicine)  Sharita Felipe MD as Assigned PCP    The following health maintenance items are reviewed in Epic and correct as of today:  Health Maintenance   Topic Date Due    MEDICARE ANNUAL WELLNESS VISIT  2023    BMP  2023     "LIPID  01/29/2025    MAMMO SCREENING  02/27/2025    PHQ-9  05/25/2025    ANNUAL REVIEW OF HM ORDERS  07/25/2025    GLUCOSE  11/11/2025    FALL RISK ASSESSMENT  11/25/2025    COLORECTAL CANCER SCREENING  01/01/2026    ADVANCE CARE PLANNING  11/25/2029    DTAP/TDAP/TD IMMUNIZATION (3 - Td or Tdap) 11/11/2032    DEXA  02/13/2038    HEPATITIS C SCREENING  Completed    DEPRESSION ACTION PLAN  Completed    INFLUENZA VACCINE  Completed    Pneumococcal Vaccine: 65+ Years  Completed    ZOSTER IMMUNIZATION  Completed    RSV VACCINE  Completed    COVID-19 Vaccine  Completed    HPV IMMUNIZATION  Aged Out    MENINGITIS IMMUNIZATION  Aged Out    RSV MONOCLONAL ANTIBODY  Aged Out        Objective    Exam  BP (!) 162/102 (BP Location: Right arm, Patient Position: Sitting, Cuff Size: Adult Regular)   Pulse 79   Temp 97.8  F (36.6  C)   Resp 16   Ht 1.651 m (5' 5\")   Wt 93.8 kg (206 lb 11.2 oz)   SpO2 98%   BMI 34.40 kg/m     Estimated body mass index is 34.4 kg/m  as calculated from the following:    Height as of this encounter: 1.651 m (5' 5\").    Weight as of this encounter: 93.8 kg (206 lb 11.2 oz).    Physical Exam  Vitals reviewed.   Neurological:      Mental Status: She is alert.                11/25/2024   Mini Cog   Clock Draw Score 2 Normal   3 Item Recall 2 objects recalled   Mini Cog Total Score 4        {A Mini-Cog total score of 0-2 suggests the possibility of dementia, score of 3-5 suggests no dementia:013269}         Signed Electronically by: Sharita Felipe MD  {Email feedback regarding this note to [email or web address]   :[id number]}  " "IMMUNIZATION  Aged Out    RSV MONOCLONAL ANTIBODY  Aged Out        Objective    Exam  BP (!) 162/102 (BP Location: Right arm, Patient Position: Sitting, Cuff Size: Adult Regular)   Pulse 79   Temp 97.8  F (36.6  C)   Resp 16   Ht 1.651 m (5' 5\")   Wt 93.8 kg (206 lb 11.2 oz)   SpO2 98%   BMI 34.40 kg/m     Estimated body mass index is 34.4 kg/m  as calculated from the following:    Height as of this encounter: 1.651 m (5' 5\").    Weight as of this encounter: 93.8 kg (206 lb 11.2 oz).    Physical Exam  Vitals reviewed.   HENT:      Right Ear: Tympanic membrane normal. There is no impacted cerumen.      Left Ear: Tympanic membrane normal. There is no impacted cerumen.   Cardiovascular:      Rate and Rhythm: Normal rate and regular rhythm.      Heart sounds: Normal heart sounds. No murmur heard.     No gallop.   Pulmonary:      Effort: Pulmonary effort is normal. No respiratory distress.      Breath sounds: Normal breath sounds. No stridor. No wheezing, rhonchi or rales.   Chest:   Breasts:     Right: No swelling, bleeding, inverted nipple, mass, nipple discharge, skin change or tenderness.      Left: No swelling, bleeding, inverted nipple, mass, nipple discharge, skin change or tenderness.   Abdominal:      General: Abdomen is flat. There is no distension.      Palpations: Abdomen is soft. There is no mass.      Tenderness: There is no abdominal tenderness. There is no guarding.      Hernia: No hernia is present.   Musculoskeletal:         General: Normal range of motion.      Cervical back: Normal range of motion and neck supple. No rigidity or tenderness.      Right lower leg: No edema.      Left lower leg: No edema.   Lymphadenopathy:      Cervical: No cervical adenopathy.   Skin:     General: Skin is warm and dry.   Neurological:      General: No focal deficit present.      Mental Status: She is alert.                11/25/2024   Mini Cog   Clock Draw Score 2 Normal   3 Item Recall 2 objects recalled   Mini " Cog Total Score 4               Signed Electronically by: Sharita Felipe MD

## 2024-11-25 NOTE — PATIENT INSTRUCTIONS
Take your blood pressure after 5 minutes of rest  Daily for 2 weeks and record the  readings (14 total readings).    Send me a Pockit message with those  readings upon doing so.

## 2024-11-26 LAB
ALBUMIN SERPL BCG-MCNC: 4.6 G/DL (ref 3.5–5.2)
ALP SERPL-CCNC: 77 U/L (ref 40–150)
ALT SERPL W P-5'-P-CCNC: 18 U/L (ref 0–50)
ANION GAP SERPL CALCULATED.3IONS-SCNC: 13 MMOL/L (ref 7–15)
AST SERPL W P-5'-P-CCNC: 23 U/L (ref 0–45)
BILIRUB SERPL-MCNC: 0.6 MG/DL
BUN SERPL-MCNC: 14.4 MG/DL (ref 8–23)
CALCIUM SERPL-MCNC: 9.5 MG/DL (ref 8.8–10.4)
CHLORIDE SERPL-SCNC: 103 MMOL/L (ref 98–107)
CREAT SERPL-MCNC: 0.67 MG/DL (ref 0.51–0.95)
EGFRCR SERPLBLD CKD-EPI 2021: >90 ML/MIN/1.73M2
GLUCOSE SERPL-MCNC: 89 MG/DL (ref 70–99)
HCO3 SERPL-SCNC: 24 MMOL/L (ref 22–29)
POTASSIUM SERPL-SCNC: 4.1 MMOL/L (ref 3.4–5.3)
PROT SERPL-MCNC: 7.6 G/DL (ref 6.4–8.3)
SODIUM SERPL-SCNC: 140 MMOL/L (ref 135–145)

## 2025-01-28 ENCOUNTER — PATIENT OUTREACH (OUTPATIENT)
Dept: CARE COORDINATION | Facility: CLINIC | Age: 70
End: 2025-01-28
Payer: COMMERCIAL

## 2025-02-28 ENCOUNTER — ANCILLARY PROCEDURE (OUTPATIENT)
Dept: MAMMOGRAPHY | Facility: CLINIC | Age: 70
End: 2025-02-28
Attending: INTERNAL MEDICINE
Payer: COMMERCIAL

## 2025-02-28 DIAGNOSIS — Z12.31 VISIT FOR SCREENING MAMMOGRAM: ICD-10-CM

## 2025-02-28 PROCEDURE — 77063 BREAST TOMOSYNTHESIS BI: CPT | Mod: TC | Performed by: STUDENT IN AN ORGANIZED HEALTH CARE EDUCATION/TRAINING PROGRAM

## 2025-02-28 PROCEDURE — 77067 SCR MAMMO BI INCL CAD: CPT | Mod: TC | Performed by: STUDENT IN AN ORGANIZED HEALTH CARE EDUCATION/TRAINING PROGRAM

## 2025-03-12 ENCOUNTER — VIRTUAL VISIT (OUTPATIENT)
Dept: FAMILY MEDICINE | Facility: CLINIC | Age: 70
End: 2025-03-12
Payer: COMMERCIAL

## 2025-03-12 DIAGNOSIS — F33.40 RECURRENT MAJOR DEPRESSION IN REMISSION: ICD-10-CM

## 2025-03-12 DIAGNOSIS — E78.2 MIXED HYPERLIPIDEMIA: ICD-10-CM

## 2025-03-12 DIAGNOSIS — E66.811 CLASS 1 OBESITY WITH SERIOUS COMORBIDITY AND BODY MASS INDEX (BMI) OF 34.0 TO 34.9 IN ADULT, UNSPECIFIED OBESITY TYPE: ICD-10-CM

## 2025-03-12 DIAGNOSIS — E66.811 CLASS 1 OBESITY WITH SERIOUS COMORBIDITY AND BODY MASS INDEX (BMI) OF 34.0 TO 34.9 IN ADULT, UNSPECIFIED OBESITY TYPE: Primary | ICD-10-CM

## 2025-03-12 PROCEDURE — 98006 SYNCH AUDIO-VIDEO EST MOD 30: CPT | Performed by: PHYSICIAN ASSISTANT

## 2025-03-12 RX ORDER — ROSUVASTATIN CALCIUM 20 MG/1
20 TABLET, COATED ORAL AT BEDTIME
Qty: 90 TABLET | Refills: 3 | Status: SHIPPED | OUTPATIENT
Start: 2025-03-12

## 2025-03-12 NOTE — PROGRESS NOTES
Zach is a 69 year old who is being evaluated via a billable video visit.    How would you like to obtain your AVS? MyChart  If the video visit is dropped, the invitation should be resent by: Text to cell phone: [phone]  Will anyone else be joining your video visit? No      Assessment & Plan     Class 1 obesity with serious comorbidity and body mass index (BMI) of 34.0 to 34.9 in adult, unspecified obesity type    Reviewed class I obesity with associated comorbidities and different medication options including the ones that she had mentioned metformin, phentermine.  Additionally, did discuss potential benefits of a GLP-1.  Discussed different options.  We decided to pursue Wegovy.  No personal or family history of pancreatitis, medullary thyroid cancer.  Reviewed safety/side effects/expectations.  Main issue will be insurance coverage.  Happy to complete approve prior authorization if needed for her.  Did also discuss compounding pharmacy options.  Start 0.25 mg once weekly X 1 month.  If tolerated then increase to 0.5 mg once weekly X 1 month.  Can continue to titrate as tolerated.  She states that her goal is to lose approximately ~20 in combination with diet and exercise.    - semaglutide-weight management (WEGOVY) 0.25 MG/0.5ML pen  Dispense: 2 mL; Refill: 0  - Semaglutide-Weight Management (WEGOVY) 0.5 MG/0.5ML pen  Dispense: 2 mL; Refill: 0    Mixed hyperlipidemia  Refilled rosuvastatin.  Lipid panel ordered.  Can complete fasting at her earliest convenience.    - Lipid panel reflex to direct LDL Fasting  - rosuvastatin (CRESTOR) 20 MG tablet  Dispense: 90 tablet; Refill: 3    Recurrent major depression in remission  Prozac refilled.  Stable on 20 mg daily.  Option in the future to increase to 40 mg or alternate as she had done previously based on mood.  Has been taking for approximately ~20 years.     - FLUoxetine (PROZAC) 20 MG capsule  Dispense: 90 capsule; Refill: 3      30 minutes spent by me on the  date of the encounter doing chart review, review of test results, interpretation of tests, patient visit, and documentation     The longitudinal plan of care for the diagnosis(es)/condition(s) as documented were addressed during this visit. Due to the added complexity in care, I will continue to support Zach in the subsequent management and with ongoing continuity of care.      Subjective   Zach is a very pleasant 69 year old, presenting for the following health issues:  Follow Up    Here today virtually to discuss weight management as well as medication refills and a lab request.    -On rosuvastatin 20 mg daily.  Due for repeat lipid panel.  Last in early 2024.  Requesting this.  Tolerated the medication well without issue    -On Prozac 20 mg.  Previously would take 20mg/40 mg alternating when she was in a depressive episode however mood is very stable currently on 20 mg.  Requesting refill.      -Lastly, wishes to discuss her weight.  BMI at most recent annual physical ~34.  History of hyperlipidemia, PCOS.  Did some research and wanted to discuss different options.  She did research metformin as well as phentermine.  She is never previously tried medications for weight loss however has previously tried weight watchers and a variety of different other programs.    Wt Readings from Last 2 Encounters:   11/25/24 93.8 kg (206 lb 11.2 oz)   11/07/24 96.2 kg (212 lb)     Video Start Time: 12:54 PM    History of Present Illness       Mental Health Follow-up:  Patient presents to follow-up on Depression.Patient's depression since last visit has been:  Good  The patient is having other symptoms associated with depression.      Any significant life events: other  Patient is not feeling anxious or having panic attacks.  Patient has no concerns about alcohol or drug use.    Hyperlipidemia:  She presents for follow up of hyperlipidemia.   She is taking medication to lower cholesterol. She is not having myalgia or other side  effects to statin medications.    Reason for visit:  Check in aboutuodating current needs and new one    She eats 2-3 servings of fruits and vegetables daily.She consumes 1 sweetened beverage(s) daily.She exercises with enough effort to increase her heart rate 10 to 19 minutes per day.  She exercises with enough effort to increase her heart rate 4 days per week. She is missing 1 dose(s) of medications per week.  She is not taking prescribed medications regularly due to remembering to take.            Review of Systems  Constitutional, neuro, ENT, endocrine, pulmonary, cardiac, gastrointestinal, genitourinary, musculoskeletal, integument and psychiatric systems are negative, except as otherwise noted.      Objective           Vitals:  No vitals were obtained today due to virtual visit.    Physical Exam   GENERAL: alert and no distress  EYES: Eyes grossly normal to inspection.  No discharge or erythema, or obvious scleral/conjunctival abnormalities.  RESP: No audible wheeze, cough, or visible cyanosis.    SKIN: Visible skin clear. No significant rash, abnormal pigmentation or lesions.  NEURO: Cranial nerves grossly intact.  Mentation and speech appropriate for age.  PSYCH: Appropriate affect, tone, and pace of words          Video-Visit Details    Type of service:  Video Visit   Video End Time: 1:20 PM  Originating Location (pt. Location): Home    Distant Location (provider location):  On-site  Platform used for Video Visit: Jada    The likelihood of other entities in the differential is insufficient to justify any further testing for them at this time. This was explained to the patient. The patient was advised that persistent or worsening symptoms would require further evaluation. Patient advised to call the office and if unable to reach to go to the emergency room if they develop any new or worsening symptoms. Expressed understanding and agreement with above stated plan.     Signed Electronically by: Aditya  YANG Landeros

## 2025-03-17 RX ORDER — SEMAGLUTIDE 0.5 MG/.5ML
INJECTION, SOLUTION SUBCUTANEOUS
Qty: 3 ML | Refills: 0 | Status: SHIPPED | OUTPATIENT
Start: 2025-03-17

## 2025-03-24 ENCOUNTER — LAB (OUTPATIENT)
Dept: LAB | Facility: CLINIC | Age: 70
End: 2025-03-24
Attending: PHYSICIAN ASSISTANT
Payer: COMMERCIAL

## 2025-03-24 DIAGNOSIS — E78.2 MIXED HYPERLIPIDEMIA: ICD-10-CM

## 2025-03-24 LAB
CHOLEST SERPL-MCNC: 131 MG/DL
FASTING STATUS PATIENT QL REPORTED: YES
HDLC SERPL-MCNC: 53 MG/DL
LDLC SERPL CALC-MCNC: 53 MG/DL
NONHDLC SERPL-MCNC: 78 MG/DL
TRIGL SERPL-MCNC: 126 MG/DL

## 2025-03-24 PROCEDURE — 80061 LIPID PANEL: CPT

## 2025-03-24 PROCEDURE — 36415 COLL VENOUS BLD VENIPUNCTURE: CPT

## 2025-03-24 NOTE — RESULT ENCOUNTER NOTE
Nic Gray,    Cholesterol panel looks excellent!     Let me know if you have any questions or concerns,     Aditya Landeros PA-C  [facility]

## 2025-04-06 DIAGNOSIS — E66.811 CLASS 1 OBESITY WITH SERIOUS COMORBIDITY AND BODY MASS INDEX (BMI) OF 34.0 TO 34.9 IN ADULT, UNSPECIFIED OBESITY TYPE: ICD-10-CM

## 2025-04-06 DIAGNOSIS — E78.2 MIXED HYPERLIPIDEMIA: ICD-10-CM

## 2025-04-06 DIAGNOSIS — F33.40 RECURRENT MAJOR DEPRESSION IN REMISSION: ICD-10-CM

## 2025-04-07 RX ORDER — SEMAGLUTIDE 0.25 MG/.5ML
INJECTION, SOLUTION SUBCUTANEOUS
Refills: 0 | OUTPATIENT
Start: 2025-04-07

## 2025-04-09 DIAGNOSIS — E78.2 MIXED HYPERLIPIDEMIA: ICD-10-CM

## 2025-04-09 DIAGNOSIS — F33.40 RECURRENT MAJOR DEPRESSION IN REMISSION: ICD-10-CM

## 2025-04-09 DIAGNOSIS — E66.811 CLASS 1 OBESITY WITH SERIOUS COMORBIDITY AND BODY MASS INDEX (BMI) OF 34.0 TO 34.9 IN ADULT, UNSPECIFIED OBESITY TYPE: ICD-10-CM

## 2025-04-13 NOTE — TELEPHONE ENCOUNTER
Medication was denied.  Patient was seen by maxim sorto prescribed by him  He had sent an appeal letter. What's the update?

## 2025-04-16 RX ORDER — SEMAGLUTIDE 0.5 MG/.5ML
INJECTION, SOLUTION SUBCUTANEOUS
Qty: 2 ML | Refills: 2 | Status: SHIPPED | OUTPATIENT
Start: 2025-04-16

## 2025-04-16 NOTE — TELEPHONE ENCOUNTER
It looks like reading through the denial rationale that if medication is being used specifically for weight loss that it is excluded from coverage. This would mean that no letter of medical necessity would be able to get medication covered. Please advise how you would like to proceed.    Long Roberson, CMA on 4/16/2025 at 10:46 AM

## 2025-08-20 ENCOUNTER — MYC MEDICAL ADVICE (OUTPATIENT)
Dept: FAMILY MEDICINE | Facility: CLINIC | Age: 70
End: 2025-08-20
Payer: COMMERCIAL

## 2025-08-20 DIAGNOSIS — I10 ESSENTIAL HYPERTENSION: Primary | ICD-10-CM

## 2025-08-21 RX ORDER — LOSARTAN POTASSIUM 25 MG/1
25 TABLET ORAL DAILY
Qty: 90 TABLET | Refills: 1 | Status: SHIPPED | OUTPATIENT
Start: 2025-08-21